# Patient Record
Sex: FEMALE | Race: WHITE | Employment: FULL TIME | URBAN - NONMETROPOLITAN AREA
[De-identification: names, ages, dates, MRNs, and addresses within clinical notes are randomized per-mention and may not be internally consistent; named-entity substitution may affect disease eponyms.]

---

## 2018-07-26 ENCOUNTER — HOSPITAL ENCOUNTER (EMERGENCY)
Age: 43
Discharge: HOME | End: 2018-07-26
Payer: COMMERCIAL

## 2018-07-26 VITALS
TEMPERATURE: 98.3 F | SYSTOLIC BLOOD PRESSURE: 122 MMHG | RESPIRATION RATE: 18 BRPM | DIASTOLIC BLOOD PRESSURE: 83 MMHG | BODY MASS INDEX: 27.41 KG/M2 | OXYGEN SATURATION: 97 % | HEIGHT: 65 IN | HEART RATE: 74 BPM | WEIGHT: 164.5 LBS

## 2018-07-26 DIAGNOSIS — Z76.89 ENCOUNTER TO ESTABLISH CARE WITH NEW DOCTOR: ICD-10-CM

## 2018-07-26 DIAGNOSIS — G43.009 MIGRAINE WITHOUT AURA AND WITHOUT STATUS MIGRAINOSUS, NOT INTRACTABLE: Primary | ICD-10-CM

## 2018-07-26 PROCEDURE — 99213 OFFICE O/P EST LOW 20 MIN: CPT | Performed by: NURSE PRACTITIONER

## 2018-07-26 PROCEDURE — 99214 OFFICE O/P EST MOD 30 MIN: CPT

## 2018-07-26 PROCEDURE — 2709999900 HC NON-CHARGEABLE SUPPLY

## 2018-07-26 PROCEDURE — 96372 THER/PROPH/DIAG INJ SC/IM: CPT

## 2018-07-26 PROCEDURE — 6360000002 HC RX W HCPCS: Performed by: NURSE PRACTITIONER

## 2018-07-26 RX ORDER — SUMATRIPTAN 50 MG/1
TABLET, FILM COATED ORAL
Qty: 9 TABLET | Refills: 0 | Status: SHIPPED | OUTPATIENT
Start: 2018-07-26 | End: 2018-10-01 | Stop reason: ALTCHOICE

## 2018-07-26 RX ORDER — KETOROLAC TROMETHAMINE 30 MG/ML
15 INJECTION, SOLUTION INTRAMUSCULAR; INTRAVENOUS ONCE
Status: COMPLETED | OUTPATIENT
Start: 2018-07-26 | End: 2018-07-26

## 2018-07-26 RX ADMIN — KETOROLAC TROMETHAMINE: 30 INJECTION, SOLUTION INTRAMUSCULAR at 12:56

## 2018-07-26 ASSESSMENT — PAIN SCALES - GENERAL
PAINLEVEL_OUTOF10: 8
PAINLEVEL_OUTOF10: 8

## 2018-07-26 ASSESSMENT — ENCOUNTER SYMPTOMS
SINUS PAIN: 0
SINUS PRESSURE: 0
SORE THROAT: 0
EYE PAIN: 0
PHOTOPHOBIA: 0

## 2018-07-26 ASSESSMENT — PAIN DESCRIPTION - LOCATION: LOCATION: HEAD

## 2018-07-26 ASSESSMENT — PAIN DESCRIPTION - DESCRIPTORS: DESCRIPTORS: ACHING

## 2018-07-26 NOTE — ED TRIAGE NOTES
Pt to room 2 with c/o headache and sob. Pt states has had headache about a week and sob is new. Pt denies any chest pain or cardiac issues. Pt sarah has tried otc remedies with no relief.

## 2018-07-26 NOTE — ED PROVIDER NOTES
Ibuprofen-Diphenhydramine Cit (ADVIL PM PO) Take 2 tablets by mouth as needed       Acetaminophen (TYLENOL EXTRA STRENGTH PO) Take 1,500 mg by mouth every 6 hours as needed              ALLERGIES     Patient is has No Known Allergies. Patients   Immunization History   Administered Date(s) Administered    Tdap (Boostrix, Adacel) 08/30/2016       FAMILY HISTORY     Patient's family history includes Diabetes in her paternal grandmother; Heart Disease in her mother; High Blood Pressure in her mother; Other in her maternal grandfather, maternal grandmother, mother, and paternal grandmother. SOCIAL HISTORY     Patient  reports that she has been smoking Cigarettes. She started smoking about 30 years ago. She has a 9.00 pack-year smoking history. She uses smokeless tobacco. She reports that she drinks alcohol. She reports that she does not use drugs. PHYSICAL EXAM     ED TRIAGE VITALS  BP: 122/83, Temp: 98.3 °F (36.8 °C), Pulse: 74, Resp: 18, SpO2: 97 %,Estimated body mass index is 27.37 kg/m² as calculated from the following:    Height as of this encounter: 5' 5\" (1.651 m). Weight as of this encounter: 164 lb 8 oz (74.6 kg). ,Patient's last menstrual period was 07/24/2018 (exact date). Physical Exam   Constitutional: She is oriented to person, place, and time. She appears well-developed and well-nourished. HENT:   Head: Normocephalic and atraumatic. Musculoskeletal: Normal range of motion. Neurological: She is alert and oriented to person, place, and time. Skin: Skin is warm and dry. Psychiatric: She has a normal mood and affect. Her behavior is normal. Judgment and thought content normal.       DIAGNOSTIC RESULTS   Labs:No results found for this visit on 07/26/18.     IMAGING:    No orders to display     URGENT CARE COURSE:     Vitals:    07/26/18 1235 07/26/18 1241   BP:  122/83   Pulse:  74   Resp:  18   Temp:  98.3 °F (36.8 °C)   TempSrc:  Temporal   SpO2:  97%   Weight: 164 lb 8 oz (74.6 kg) Height: 5' 5\" (1.651 m)        Medications   ketorolac (TORADOL) injection 15 mg ( Intramuscular Given 7/26/18 1256)            PROCEDURES:  None    FINAL IMPRESSION      1. Migraine without aura and without status migrainosus, not intractable    2. Encounter to establish care with new doctor          DISPOSITION/PLAN   DISPOSITION Decision To Discharge 07/26/2018 12:59:18 PM patient is discharged home with prescription for Imitrex, 9 tablets with instructions on how to take. Patient was also given Toradol injection while urgent care setting. Discussed with patient that she would be referred to Nawaf Ruiz CNP for further management of migraines. PATIENT REFERRED TO:  No follow-up provider specified. DISCHARGE MEDICATIONS:  Discharge Medication List as of 7/26/2018  1:07 PM      START taking these medications    Details   SUMAtriptan (IMITREX) 50 MG tablet Take as needed, 1 tablet. May take 2nd tablet within 2 hours if headaches continues. Do not take more than 200 mg in a 24 hour period. , Disp-9 tablet, R-0Print             Discharge Medication List as of 7/26/2018  1:07 PM      STOP taking these medications       Pseudoephedrine HCl (SUDAFED PO) Comments:   Reason for Stopping:         topiramate (TOPAMAX) 50 MG tablet Comments:   Reason for Stopping:         buPROPion (WELLBUTRIN XL) 150 MG XL tablet Comments:   Reason for Stopping:         traZODone (DESYREL) 50 MG tablet Comments:   Reason for Stopping:         guaiFENesin (MUCINEX) 600 MG SR tablet Comments:   Reason for Stopping:         guaiFENesin (ALTARUSSIN) 100 MG/5ML syrup Comments:   Reason for Stopping:               Discharge Medication List as of 7/26/2018  1:07 PM          ANGELA Hernández - NP    (Please note that portions of this note were completed with a voice recognition program.  Efforts were made to edit the dictations but occasionally words are mis-transcribed.)         ANGELA Dsouza - NP  07/26/18 134

## 2018-08-01 ENCOUNTER — OFFICE VISIT (OUTPATIENT)
Dept: FAMILY MEDICINE CLINIC | Age: 43
End: 2018-08-01
Payer: COMMERCIAL

## 2018-08-01 VITALS
DIASTOLIC BLOOD PRESSURE: 60 MMHG | RESPIRATION RATE: 16 BRPM | WEIGHT: 164.4 LBS | HEART RATE: 83 BPM | TEMPERATURE: 98.1 F | SYSTOLIC BLOOD PRESSURE: 96 MMHG | BODY MASS INDEX: 27.39 KG/M2 | HEIGHT: 65 IN

## 2018-08-01 DIAGNOSIS — G43.909 MIGRAINE WITHOUT STATUS MIGRAINOSUS, NOT INTRACTABLE, UNSPECIFIED MIGRAINE TYPE: Primary | ICD-10-CM

## 2018-08-01 PROCEDURE — 99213 OFFICE O/P EST LOW 20 MIN: CPT | Performed by: NURSE PRACTITIONER

## 2018-08-01 RX ORDER — TOPIRAMATE 25 MG/1
25 TABLET ORAL NIGHTLY
Qty: 30 TABLET | Refills: 3 | Status: SHIPPED | OUTPATIENT
Start: 2018-08-01 | End: 2018-10-01 | Stop reason: SDUPTHER

## 2018-08-01 RX ORDER — HYDROCODONE BITARTRATE AND ACETAMINOPHEN 5; 325 MG/1; MG/1
1 TABLET ORAL EVERY 6 HOURS PRN
Qty: 28 TABLET | Refills: 0 | Status: SHIPPED | OUTPATIENT
Start: 2018-08-01 | End: 2018-08-08

## 2018-08-01 ASSESSMENT — PATIENT HEALTH QUESTIONNAIRE - PHQ9
1. LITTLE INTEREST OR PLEASURE IN DOING THINGS: 0
SUM OF ALL RESPONSES TO PHQ QUESTIONS 1-9: 0
2. FEELING DOWN, DEPRESSED OR HOPELESS: 0
SUM OF ALL RESPONSES TO PHQ9 QUESTIONS 1 & 2: 0

## 2018-08-01 NOTE — PROGRESS NOTES
Radha Ghosh is a 39 y.o. female that presents for Establish Care (Pt presents to establish care with us. She was a previous pt of Kei Claire at [facility].) and Headache (Pt is c/o a headache that she has had for about 2 weeks. She went to  last week and was given imitrex and an injection of Kenalog, but she states she is not doing much better. The imitrex takes the edge off, but it doesn't do more than that. Pt states her daughter called her on Sunday and told her she has the same sxs. )      Headache    HPI:Pt has taken topamax in the past for migraine headaches and worked well. She did have an mri of brain in 2016 that was normal.     Location - frontal and travels to back of head  Duration2 weeks ago  Inciting event? - No  Severity  6/10  History of migraines? -  Yes  Treatment tried and response - imitrex and kenalog tylenol and ibuprofen    Fever over 100.5 No  Neck stiffness  No  Weakness of arm/leg  No  Nausea/vomiting - Yes - she has bene nauseated  \"Worst headache ever\" - No  Confusion - she does have trouble concentrating and thinking because the pain is intense at times but does dull at other times    PHYSICAL EXAM:    Vitals:    08/01/18 1505   BP: 96/60   Pulse: 83   Resp: 16   Temp: 98.1 °F (36.7 °C)     GEN:  NAD  HEENT:  NCAT, PERRL, EOMI, Nares clear, turbinates pink, mucosa is moist.  Oropharynx  is clear. Hearing grossly intact. Dentition is normal for age. Neck: No lymphadenopathy or masses. No neck stiffness noted   Heart: RRR. S1 and S2 normal, no murmurs, clicks, gallops or rubs. Lungs:  CTAB,  . Abdomen:  Soft, non tender, non distended. No rebound or guarding. No organomegaly. Extremities:  No gross deformity, erythema or edema of the lower extremities. Skin: No pathologic lesions or significant rash. Neurological:  A&Ox3. CN 2-12 grossly intact. 5/5 strength globally and symmetrically.  Cerebellar testing wnl. 2+ patellar reflexes b/l    ASSESSMENT & PLAN  Fifi

## 2018-10-01 ENCOUNTER — OFFICE VISIT (OUTPATIENT)
Dept: FAMILY MEDICINE CLINIC | Age: 43
End: 2018-10-01
Payer: COMMERCIAL

## 2018-10-01 VITALS
RESPIRATION RATE: 16 BRPM | HEIGHT: 65 IN | DIASTOLIC BLOOD PRESSURE: 80 MMHG | WEIGHT: 165.4 LBS | SYSTOLIC BLOOD PRESSURE: 118 MMHG | BODY MASS INDEX: 27.56 KG/M2 | TEMPERATURE: 98.1 F | HEART RATE: 70 BPM

## 2018-10-01 DIAGNOSIS — Z72.0 TOBACCO ABUSE: ICD-10-CM

## 2018-10-01 DIAGNOSIS — Z91.89 LACK OF MOTIVATION: ICD-10-CM

## 2018-10-01 DIAGNOSIS — R45.4 IRRITABLE: ICD-10-CM

## 2018-10-01 DIAGNOSIS — Z71.6 ENCOUNTER FOR SMOKING CESSATION COUNSELING: ICD-10-CM

## 2018-10-01 DIAGNOSIS — L29.9 ITCHING: Primary | ICD-10-CM

## 2018-10-01 DIAGNOSIS — G43.909 MIGRAINE WITHOUT STATUS MIGRAINOSUS, NOT INTRACTABLE, UNSPECIFIED MIGRAINE TYPE: ICD-10-CM

## 2018-10-01 PROCEDURE — 99214 OFFICE O/P EST MOD 30 MIN: CPT | Performed by: NURSE PRACTITIONER

## 2018-10-01 RX ORDER — TOPIRAMATE 25 MG/1
25 TABLET ORAL NIGHTLY
Qty: 90 TABLET | Refills: 1 | Status: SHIPPED | OUTPATIENT
Start: 2018-10-01 | End: 2020-01-27

## 2018-10-01 RX ORDER — LORATADINE 10 MG/1
10 TABLET ORAL DAILY
Qty: 30 TABLET | Refills: 3 | Status: SHIPPED | OUTPATIENT
Start: 2018-10-01

## 2018-10-01 RX ORDER — BUPROPION HYDROCHLORIDE 150 MG/1
150 TABLET ORAL EVERY MORNING
Qty: 30 TABLET | Refills: 3 | Status: SHIPPED | OUTPATIENT
Start: 2018-10-01 | End: 2019-01-17 | Stop reason: SDUPTHER

## 2018-10-01 ASSESSMENT — ENCOUNTER SYMPTOMS
RESPIRATORY NEGATIVE: 1
COLOR CHANGE: 0

## 2018-12-10 ENCOUNTER — OFFICE VISIT (OUTPATIENT)
Dept: FAMILY MEDICINE CLINIC | Age: 43
End: 2018-12-10
Payer: COMMERCIAL

## 2018-12-10 VITALS
BODY MASS INDEX: 26.06 KG/M2 | DIASTOLIC BLOOD PRESSURE: 70 MMHG | TEMPERATURE: 98.3 F | WEIGHT: 166 LBS | SYSTOLIC BLOOD PRESSURE: 104 MMHG | HEART RATE: 72 BPM | HEIGHT: 67 IN | RESPIRATION RATE: 16 BRPM

## 2018-12-10 DIAGNOSIS — R07.89 OTHER CHEST PAIN: ICD-10-CM

## 2018-12-10 DIAGNOSIS — F41.9 ANXIETY: ICD-10-CM

## 2018-12-10 DIAGNOSIS — K58.2 IRRITABLE BOWEL SYNDROME WITH BOTH CONSTIPATION AND DIARRHEA: ICD-10-CM

## 2018-12-10 DIAGNOSIS — R07.89 CHEST TIGHTNESS: Primary | ICD-10-CM

## 2018-12-10 DIAGNOSIS — R14.3 FLATULENCE: ICD-10-CM

## 2018-12-10 DIAGNOSIS — Z71.6 ENCOUNTER FOR TOBACCO USE CESSATION COUNSELING: ICD-10-CM

## 2018-12-10 PROCEDURE — 99214 OFFICE O/P EST MOD 30 MIN: CPT | Performed by: NURSE PRACTITIONER

## 2018-12-10 PROCEDURE — 93000 ELECTROCARDIOGRAM COMPLETE: CPT | Performed by: NURSE PRACTITIONER

## 2018-12-10 RX ORDER — DICYCLOMINE HYDROCHLORIDE 10 MG/1
10 CAPSULE ORAL 4 TIMES DAILY
Qty: 120 CAPSULE | Refills: 3 | Status: SHIPPED | OUTPATIENT
Start: 2018-12-10 | End: 2022-04-27 | Stop reason: ALTCHOICE

## 2018-12-10 ASSESSMENT — ENCOUNTER SYMPTOMS
RHINORRHEA: 0
GASTROINTESTINAL NEGATIVE: 1
CHEST TIGHTNESS: 1
SINUS PAIN: 0

## 2018-12-10 NOTE — PROGRESS NOTES
[address]  [address] [address]  Dept: [phone]  Dept Fax: [phone]  Loc: [phone]    Ghanshyam Kirkpatrick is a 39 y.o. Maurice Lowe presents today for her medical conditions/complaints as noted below. Fifi Brizuela c/o of Follow-up (smoking ); Other (chess tightness ); and Gas (lots after eating  abd cramping  with  B.M.s)      HPI:     Pt here to discuss several things. States she has been taking the wellbutrin for smoking cessation and she has decreased her smoking from 1-1 1/2 packs per day to 5 cigarettes per day. She states she is no longer thinking all day about smoking and can get more work done. States her craving has decreased and she also noticed has helped to improve her concentration. HEr mother had a CABG at age of 27 and passed away at age 64 due to heart related illness. States she has frequent bowel cramping and feels gassy after eating. She states the gas does usually come before a bowel movement. They will be soft and some days she does not have a bowel movement at all. Denies blood in her stool, denies any autoimmune illness in her family. Chest Pain    Location -  left chest  Symptoms have been present for 2 month(s). Onset of symptoms was sudden. Inciting Event? No       Description of chest pain -heaviness, squeezing . The pain does not radiate. Intensity - moderate. Aggravating factors - nothing. Alleviating factors -  Nothing, it mostly happens at rest and goes away on it's own. N/V? No  SOB? No  Lightheadedness? No  Palpitations? No  Diaphoresis? No  Cough? No    Cardiac risk factors , smoking/tobacco exposure, family history, and sedentary lifestyle)?             Current Outpatient Prescriptions   Medication Sig Dispense Refill    dicyclomine (BENTYL) 10 MG capsule Take 1 capsule by mouth 4 times daily 120 capsule 3    topiramate (TOPAMAX) 25 MG tablet Take 1 tablet by Yes[]  No  []       Patient given educational materials -see patient instructions. Discussed use, benefit, and side effects of prescribedmedications. All patient questions answered. Pt voiced understanding. Reviewedhealth maintenance. Instructed to continue current medications, diet and exercise. Patient agreed with treatment plan. Follow up as directed.        Electronicallysigned by ANGELA Cedillo CNP on 12/10/2018 at 9:46 AM

## 2018-12-10 NOTE — PATIENT INSTRUCTIONS
You may receive a survey about your visit with us today. The feedback from our patients helps us identify what is working well and where the service to all patients can be enhanced. Thank you! Patient Education        Diet for Irritable Bowel Syndrome: Care Instructions  Your Care Instructions    Irritable bowel syndrome, or IBS, is a problem with the intestines. IBS can cause belly pain, bloating, gas, constipation, and diarrhea. Most people can control their symptoms by changing their diet and easing stress. No specific foods cause everyone with IBS to have symptoms. Doctors don't offer a specific diet to manage symptoms. But many people find that they feel better when they stop eating certain foods. A high-fiber diet may help if you have constipation. Follow-up care is a key part of your treatment and safety. Be sure to make and go to all appointments, and call your doctor if you are having problems. It's also a good idea to know your test results and keep a list of the medicines you take. How can you care for yourself at home? To reduce constipation  · Include fruits, vegetables, beans, and whole grains in your diet each day. These foods are high in fiber. Slowly increase the amount of fiber you eat. This helps you avoid a lot of gas. · Drink plenty of fluids, enough so that your urine is light yellow or clear like water. If you have kidney, heart, or liver disease and have to limit fluids, talk with your doctor before you increase the amount of fluids you drink. · Get some exercise every day. Build up slowly to 30 to 60 minutes a day on 5 or more days of the week. · Take a fiber supplement, such as Citrucel or Metamucil, every day if needed. Read and follow all instructions on the label. · Schedule time each day for a bowel movement. Having a daily routine may help. Take your time and do not strain when having a bowel movement.   · Check with your doctor before you increase the amount of fiber in your

## 2018-12-18 ENCOUNTER — TELEPHONE (OUTPATIENT)
Dept: FAMILY MEDICINE CLINIC | Age: 43
End: 2018-12-18

## 2018-12-18 DIAGNOSIS — R07.9 CHEST PAIN, UNSPECIFIED TYPE: Primary | ICD-10-CM

## 2019-01-11 ENCOUNTER — HOSPITAL ENCOUNTER (OUTPATIENT)
Age: 44
Discharge: HOME | End: 2019-01-11
Payer: COMMERCIAL

## 2019-01-11 ENCOUNTER — HOSPITAL ENCOUNTER (OUTPATIENT)
Dept: NON INVASIVE DIAGNOSTICS | Age: 44
Discharge: HOME | End: 2019-01-11
Payer: COMMERCIAL

## 2019-01-11 VITALS — BODY MASS INDEX: 27.99 KG/M2 | WEIGHT: 168 LBS | HEIGHT: 65 IN

## 2019-01-11 DIAGNOSIS — R07.9 CHEST PAIN, UNSPECIFIED TYPE: ICD-10-CM

## 2019-01-11 DIAGNOSIS — R07.89 OTHER CHEST PAIN: ICD-10-CM

## 2019-01-11 DIAGNOSIS — R07.89 CHEST TIGHTNESS: ICD-10-CM

## 2019-01-11 LAB
ANION GAP SERPL CALCULATED.3IONS-SCNC: 12 MEQ/L (ref 8–16)
BUN BLDV-MCNC: 12 MG/DL (ref 7–22)
CALCIUM SERPL-MCNC: 9.3 MG/DL (ref 8.5–10.5)
CHLORIDE BLD-SCNC: 105 MEQ/L (ref 98–111)
CHOLESTEROL, TOTAL: 205 MG/DL (ref 100–199)
CO2: 24 MEQ/L (ref 23–33)
CREAT SERPL-MCNC: 0.9 MG/DL (ref 0.4–1.2)
GFR SERPL CREATININE-BSD FRML MDRD: 68 ML/MIN/1.73M2
GLUCOSE BLD-MCNC: 83 MG/DL (ref 70–108)
HDLC SERPL-MCNC: 54 MG/DL
LDL CHOLESTEROL CALCULATED: 125 MG/DL
POTASSIUM SERPL-SCNC: 3.8 MEQ/L (ref 3.5–5.2)
SODIUM BLD-SCNC: 141 MEQ/L (ref 135–145)
TRIGL SERPL-MCNC: 131 MG/DL (ref 0–199)

## 2019-01-11 PROCEDURE — 36415 COLL VENOUS BLD VENIPUNCTURE: CPT

## 2019-01-11 PROCEDURE — 93017 CV STRESS TEST TRACING ONLY: CPT | Performed by: INTERNAL MEDICINE

## 2019-01-11 PROCEDURE — 80061 LIPID PANEL: CPT

## 2019-01-11 PROCEDURE — 80048 BASIC METABOLIC PNL TOTAL CA: CPT

## 2019-01-12 ENCOUNTER — TELEPHONE (OUTPATIENT)
Dept: FAMILY MEDICINE CLINIC | Age: 44
End: 2019-01-12

## 2019-01-14 ENCOUNTER — TELEPHONE (OUTPATIENT)
Dept: FAMILY MEDICINE CLINIC | Age: 44
End: 2019-01-14

## 2019-01-17 ENCOUNTER — OFFICE VISIT (OUTPATIENT)
Dept: FAMILY MEDICINE CLINIC | Age: 44
End: 2019-01-17
Payer: COMMERCIAL

## 2019-01-17 ENCOUNTER — TELEPHONE (OUTPATIENT)
Dept: FAMILY MEDICINE CLINIC | Age: 44
End: 2019-01-17

## 2019-01-17 VITALS
DIASTOLIC BLOOD PRESSURE: 70 MMHG | WEIGHT: 168.6 LBS | TEMPERATURE: 98.7 F | SYSTOLIC BLOOD PRESSURE: 112 MMHG | BODY MASS INDEX: 26.46 KG/M2 | HEIGHT: 67 IN | RESPIRATION RATE: 18 BRPM | HEART RATE: 62 BPM

## 2019-01-17 DIAGNOSIS — K58.2 IRRITABLE BOWEL SYNDROME WITH BOTH CONSTIPATION AND DIARRHEA: ICD-10-CM

## 2019-01-17 DIAGNOSIS — G44.229 CHRONIC TENSION-TYPE HEADACHE, NOT INTRACTABLE: Primary | ICD-10-CM

## 2019-01-17 DIAGNOSIS — Z71.6 ENCOUNTER FOR SMOKING CESSATION COUNSELING: ICD-10-CM

## 2019-01-17 DIAGNOSIS — R45.86 EMOTIONAL LABILITY: ICD-10-CM

## 2019-01-17 DIAGNOSIS — Z72.0 TOBACCO ABUSE: ICD-10-CM

## 2019-01-17 PROCEDURE — 99213 OFFICE O/P EST LOW 20 MIN: CPT | Performed by: NURSE PRACTITIONER

## 2019-01-17 RX ORDER — VENLAFAXINE HYDROCHLORIDE 37.5 MG/1
37.5 CAPSULE, EXTENDED RELEASE ORAL DAILY
Qty: 30 CAPSULE | Refills: 3 | Status: SHIPPED | OUTPATIENT
Start: 2019-01-17 | End: 2019-03-11 | Stop reason: SINTOL

## 2019-01-17 RX ORDER — BUPROPION HYDROCHLORIDE 150 MG/1
150 TABLET ORAL EVERY MORNING
Qty: 30 TABLET | Refills: 6 | Status: SHIPPED | OUTPATIENT
Start: 2019-01-17 | End: 2019-03-11 | Stop reason: ALTCHOICE

## 2019-01-17 ASSESSMENT — ENCOUNTER SYMPTOMS
RESPIRATORY NEGATIVE: 1
DIARRHEA: 0
NAUSEA: 0
ABDOMINAL PAIN: 0
CONSTIPATION: 0
BLOOD IN STOOL: 0
ABDOMINAL DISTENTION: 0

## 2019-03-11 ENCOUNTER — OFFICE VISIT (OUTPATIENT)
Dept: FAMILY MEDICINE CLINIC | Age: 44
End: 2019-03-11
Payer: COMMERCIAL

## 2019-03-11 ENCOUNTER — HOSPITAL ENCOUNTER (OUTPATIENT)
Age: 44
Discharge: HOME | End: 2019-03-11
Payer: COMMERCIAL

## 2019-03-11 ENCOUNTER — HOSPITAL ENCOUNTER (OUTPATIENT)
Dept: GENERAL RADIOLOGY | Age: 44
Discharge: HOME | End: 2019-03-11
Payer: COMMERCIAL

## 2019-03-11 VITALS
SYSTOLIC BLOOD PRESSURE: 116 MMHG | HEIGHT: 66 IN | TEMPERATURE: 98.1 F | DIASTOLIC BLOOD PRESSURE: 74 MMHG | BODY MASS INDEX: 28.45 KG/M2 | HEART RATE: 76 BPM | RESPIRATION RATE: 14 BRPM | WEIGHT: 177 LBS

## 2019-03-11 DIAGNOSIS — R05.3 PERSISTENT DRY COUGH: ICD-10-CM

## 2019-03-11 DIAGNOSIS — R05.3 PERSISTENT DRY COUGH: Primary | ICD-10-CM

## 2019-03-11 PROCEDURE — 71046 X-RAY EXAM CHEST 2 VIEWS: CPT

## 2019-03-11 PROCEDURE — 99213 OFFICE O/P EST LOW 20 MIN: CPT | Performed by: NURSE PRACTITIONER

## 2019-03-11 RX ORDER — BENZONATATE 100 MG/1
100-200 CAPSULE ORAL 3 TIMES DAILY PRN
Qty: 60 CAPSULE | Refills: 1 | Status: SHIPPED | OUTPATIENT
Start: 2019-03-11 | End: 2019-03-18

## 2019-03-11 ASSESSMENT — ENCOUNTER SYMPTOMS
CHOKING: 0
COUGH: 1
CHEST TIGHTNESS: 0
SHORTNESS OF BREATH: 0
WHEEZING: 0
SINUS PAIN: 0
SINUS PRESSURE: 0

## 2019-03-11 ASSESSMENT — PATIENT HEALTH QUESTIONNAIRE - PHQ9
SUM OF ALL RESPONSES TO PHQ QUESTIONS 1-9: 1
SUM OF ALL RESPONSES TO PHQ QUESTIONS 1-9: 1
2. FEELING DOWN, DEPRESSED OR HOPELESS: 0
SUM OF ALL RESPONSES TO PHQ9 QUESTIONS 1 & 2: 1
1. LITTLE INTEREST OR PLEASURE IN DOING THINGS: 1

## 2019-03-12 ENCOUNTER — TELEPHONE (OUTPATIENT)
Dept: FAMILY MEDICINE CLINIC | Age: 44
End: 2019-03-12

## 2019-08-29 ENCOUNTER — OFFICE VISIT (OUTPATIENT)
Dept: FAMILY MEDICINE CLINIC | Age: 44
End: 2019-08-29
Payer: COMMERCIAL

## 2019-08-29 VITALS
DIASTOLIC BLOOD PRESSURE: 62 MMHG | SYSTOLIC BLOOD PRESSURE: 124 MMHG | TEMPERATURE: 98.1 F | HEART RATE: 80 BPM | WEIGHT: 176.2 LBS | BODY MASS INDEX: 28.02 KG/M2 | RESPIRATION RATE: 10 BRPM

## 2019-08-29 DIAGNOSIS — F34.1 DYSTHYMIA: Primary | ICD-10-CM

## 2019-08-29 DIAGNOSIS — R41.840 DISTURBED CONCENTRATION: ICD-10-CM

## 2019-08-29 DIAGNOSIS — Z91.89 LACK OF MOTIVATION: ICD-10-CM

## 2019-08-29 PROCEDURE — 99213 OFFICE O/P EST LOW 20 MIN: CPT | Performed by: NURSE PRACTITIONER

## 2019-08-29 RX ORDER — ATOMOXETINE 18 MG/1
18 CAPSULE ORAL DAILY
Qty: 30 CAPSULE | Refills: 3 | Status: SHIPPED | OUTPATIENT
Start: 2019-08-29 | End: 2019-12-18

## 2019-08-29 ASSESSMENT — ENCOUNTER SYMPTOMS: RESPIRATORY NEGATIVE: 1

## 2019-08-29 NOTE — PATIENT INSTRUCTIONS
Tobacco Cessation Programs     Telephonic behavior modification  Cherylene Barnacle ([phone])   Counseling service for those who are ready to quit using tobacco.     Available for uninsured [place] residents, [place] recipients, pregnant women, or patients whose health plans or employers are members of the [address] [place] [address] behavior modification   http://[place]. [employer]uitlogix. org   Online support program to help patients through each step of the quitting process. Available 24 hours a day 7 days a week. Provides up to date researched based tool, step-by-step guides, and motivational messages. Online behavior modification   www.lungusa.org/stop-smoking/how-to-quit   HelpLine: [facility] (895-5405)   Email questions to:  Rexford@[employer]. TOOVIA    Website offers resources to help tobacco users figure out their reasons for quitting and then take the big step of quitting for good. Hypnosis   Location: [address] [facility]   Contact: Lennox Hyde, PhD at [phone]   Hypnosis for tobacco cessation   Cost $225 for the initial session and $175 for each session afterwards. Most patients require 6-8 sessions. There is the option to submit through the patients insurance. Hypnosis and behavior modification   Location: [facility] El[address],  [facility] [address] [facility] [address] FONG [employer], [place]   Contact: Carmen Rodriguez, PhD at [phone]  [facility] Counseling and hypnosis for nicotine addition   Cost: For uninsured patients:  Please call above phone number  Cost for insured patients depends on patients insurance plan. Behavior modification   Location: [facility], [address] [facility] [address], [place]   Contact: Logan Moore include four one-on-one appointments between the patient and a respiratory therapist.  The four appointments span over three weeks. The respiratory therapist schedules one of the appointments to occur 48 hours after the patients quit date.     Cost $100 total for the four sessions. Tobacco cessation products are not included in the cost and are not provided by [address] may receive a survey about your visit with us today. The feedback from our patients helps us identify what is working well and where the service to all patients can be enhanced. Thank you! Patient Education        atomoxetine  Pronunciation:  AT oh mox e teen  Brand:  Strattera  What is the most important information I should know about atomoxetine? You should not use atomoxetine if you have narrow-angle glaucoma, an adrenal gland tumor, heart disease or coronary artery disease, or moderate to severe high blood pressure. Do not use atomoxetine if you have taken an MAO inhibitor in the past 14 days, including isocarboxazid, linezolid, methylene blue injection, phenelzine, rasagiline, selegiline, tranylcypromine, and others. Atomoxetine may cause new or worsening psychosis (unusual thoughts or behavior), especially if you have a history of depression, mental illness, or bipolar disorder. Atomoxetine has caused stroke, heart attack, and sudden death in people with high blood pressure, heart disease, or a heart defect. Some young people have thoughts about suicide when first taking atomoxetine, or whenever the dose is changed. Stay alert to changes in your mood or symptoms, especially if you have ever had suicidal thoughts. What is atomoxetine? Atomoxetine affects chemicals in the brain and nerves that contribute to hyperactivity and impulse control. Atomoxetine is used to treat attention deficit hyperactivity disorder (ADHD). Atomoxetine may also be used for purposes not listed in this medication guide. What should I discuss with my healthcare provider before taking atomoxetine? Do not use atomoxetine if you have used an MAO inhibitor in the past 14 days. A dangerous drug interaction could occur.  MAO inhibitors include isocarboxazid, linezolid, methylene blue injection, phenelzine, rasagiline, selegiline, tranylcypromine, and others. You should not use atomoxetine if you are allergic to it, or if you have:  · severe heart or blood vessel problems;  · narrow-angle glaucoma; or  · pheochromocytoma (tumor of the adrenal gland). Atomoxetine has caused stroke, heart attack, and sudden death in certain people. Tell your doctor if you have:  · heart problems or a congenital heart defect;  · high blood pressure; or  · a family history of heart disease or sudden death. To make sure this medicine is safe for you, tell your doctor if you or anyone in your family has ever had:  · depression, mental illness, bipolar disorder, psychosis;  · suicidal thoughts or actions;  · low blood pressure; or  · liver disease. Some young people have thoughts about suicide when first taking atomoxetine, or whenever the dose is changed. Your doctor should check your progress at regular visits. Your family or other caregivers should also be alert to changes in your mood or symptoms. It is not known whether this medicine will harm an unborn baby. Tell your doctor if you are pregnant or plan to become pregnant. It is not known whether atomoxetine passes into breast milk or if it could affect a nursing baby. Tell your doctor if you are breast-feeding a baby. Atomoxetine is not approved for use by anyone younger than 10years old. How should I take atomoxetine? Follow all directions on your prescription label. Your doctor may occasionally change your dose. Do not use this medicine in larger or smaller amounts or for longer than recommended. Take the medicine at the same time each day, with a full glass of water. Atomoxetine is usually taken once daily in the morning, or two times per day in the morning and late afternoon. Follow your doctor's instructions. You may take atomoxetine with or without food. Do not crush, chew, break, or open an atomoxetine capsule. Swallow it whole.  Tell your doctor if you have trouble swallowing the capsules. Use atomoxetine regularly to get the most benefit. Get your prescription refilled before you run out of medicine completely. Do not use a capsule that has been opened or accidentally broken. The medicine from inside the capsule can be dangerous if it gets in your eyes. If this occurs, rinse your eyes with water. Ask your doctor or pharmacist how to safely handle and dispose of a broken capsule. While taking atomoxetine, your doctor will need to check your progress at regular visits. Your heart rate, blood pressure, height and weight may also need to be checked often. Store at room temperature away from moisture and heat. What happens if I miss a dose? Take the missed dose as soon as you remember. Skip the missed dose if it is almost time for your next scheduled dose. Do not take extra medicine to make up the missed dose. What happens if I overdose? Seek emergency medical attention or call the Poison Help line at 1-443.306.2266. Overdose symptoms may include drowsiness, dizziness, stomach problems, tremors, or unusual behavior. What should I avoid while taking atomoxetine? Avoid using or handling an open or broken capsule. If the medicine from inside the capsule gets in your eyes, rinse them thoroughly with water and call your doctor. Atomoxetine may impair your thinking or reactions. Be careful if you drive or do anything that requires you to be alert. What are the possible side effects of atomoxetine? Get emergency medical help if you have signs of an allergic reaction: hives; difficult breathing; swelling of your face, lips, tongue, or throat. Report any new or worsening symptoms to your doctor, such as: anxiety, panic attacks, trouble sleeping, or if you feel impulsive, irritable, agitated, hostile, aggressive, restless, hyperactive (mentally or physically), depressed, or have thoughts about suicide or hurting yourself.   Atomoxetine can affect growth in children. Tell your doctor if your child is not growing at a normal rate while using this medicine. Stop using this medicine and call your doctor at once if you have:  · signs of heart problems --chest pain, trouble breathing, feeling like you might pass out;  · signs of psychosis --hallucinations (seeing or hearing things that are not real), new behavior problems, aggression, hostility, paranoia;  · liver problems --stomach pain (upper right side), itching, flu-like symptoms, dark urine, jaundice (yellowing of the skin or eyes);  · painful or difficult urination; or  · erection is painful or lasts longer than 4 hours (this is a rare side effect). Common side effects may include:  · nausea, vomiting, upset stomach, constipation;  · dry mouth, loss of appetite;  · mood changes, feeling tired;  · dizziness;  · urination problems; or  · impotence, trouble having an erection. This is not a complete list of side effects and others may occur. Call your doctor for medical advice about side effects. You may report side effects to FDA at 8-688-FDA-7235. What other drugs will affect atomoxetine? Tell your doctor about all your current medicines and any you start or stop using, especially:  · an antidepressant;  · asthma medication;  · blood pressure medicine; or  · a cold or allergy medicine that contains a decongestant such as pseudoephedrine or phenylephrine. This list is not complete. Other drugs may interact with atomoxetine, including prescription and over-the-counter medicines, vitamins, and herbal products. Not all possible interactions are listed in this medication guide. Where can I get more information? Your pharmacist can provide more information about atomoxetine. Remember, keep this and all other medicines out of the reach of children, never share your medicines with others, and use this medication only for the indication prescribed.   Every effort has been made to ensure that the information provided by

## 2019-09-26 ENCOUNTER — OFFICE VISIT (OUTPATIENT)
Dept: FAMILY MEDICINE CLINIC | Age: 44
End: 2019-09-26
Payer: COMMERCIAL

## 2019-09-26 VITALS
WEIGHT: 175 LBS | HEART RATE: 84 BPM | DIASTOLIC BLOOD PRESSURE: 76 MMHG | RESPIRATION RATE: 10 BRPM | SYSTOLIC BLOOD PRESSURE: 106 MMHG | BODY MASS INDEX: 28.12 KG/M2 | TEMPERATURE: 98.5 F | HEIGHT: 66 IN

## 2019-09-26 DIAGNOSIS — F41.9 ANXIETY: Primary | ICD-10-CM

## 2019-09-26 DIAGNOSIS — R41.840 CONCENTRATION DEFICIT: ICD-10-CM

## 2019-09-26 DIAGNOSIS — F34.1 DYSTHYMIA: ICD-10-CM

## 2019-09-26 DIAGNOSIS — Z23 NEEDS FLU SHOT: ICD-10-CM

## 2019-09-26 PROCEDURE — 90686 IIV4 VACC NO PRSV 0.5 ML IM: CPT | Performed by: NURSE PRACTITIONER

## 2019-09-26 PROCEDURE — 90471 IMMUNIZATION ADMIN: CPT | Performed by: NURSE PRACTITIONER

## 2019-09-26 PROCEDURE — 99213 OFFICE O/P EST LOW 20 MIN: CPT | Performed by: NURSE PRACTITIONER

## 2019-09-26 RX ORDER — ATOMOXETINE 18 MG/1
18 CAPSULE ORAL DAILY
Qty: 30 CAPSULE | Refills: 3 | Status: CANCELLED | OUTPATIENT
Start: 2019-09-26

## 2019-12-18 ENCOUNTER — TELEPHONE (OUTPATIENT)
Dept: FAMILY MEDICINE CLINIC | Age: 44
End: 2019-12-18

## 2019-12-18 ENCOUNTER — NURSE ONLY (OUTPATIENT)
Dept: LAB | Age: 44
End: 2019-12-18

## 2019-12-18 ENCOUNTER — OFFICE VISIT (OUTPATIENT)
Dept: FAMILY MEDICINE CLINIC | Age: 44
End: 2019-12-18
Payer: COMMERCIAL

## 2019-12-18 VITALS
WEIGHT: 176.6 LBS | BODY MASS INDEX: 27.72 KG/M2 | TEMPERATURE: 97.8 F | RESPIRATION RATE: 10 BRPM | SYSTOLIC BLOOD PRESSURE: 101 MMHG | HEIGHT: 67 IN | HEART RATE: 81 BPM | DIASTOLIC BLOOD PRESSURE: 61 MMHG

## 2019-12-18 DIAGNOSIS — R10.11 RUQ PAIN: ICD-10-CM

## 2019-12-18 DIAGNOSIS — R53.83 FATIGUE, UNSPECIFIED TYPE: ICD-10-CM

## 2019-12-18 DIAGNOSIS — F41.9 ANXIETY: ICD-10-CM

## 2019-12-18 DIAGNOSIS — R11.0 NAUSEA: ICD-10-CM

## 2019-12-18 DIAGNOSIS — F34.1 DYSTHYMIA: ICD-10-CM

## 2019-12-18 DIAGNOSIS — E03.9 HYPOTHYROIDISM, UNSPECIFIED TYPE: Primary | ICD-10-CM

## 2019-12-18 DIAGNOSIS — R41.840 CONCENTRATION DEFICIT: Primary | ICD-10-CM

## 2019-12-18 LAB
AMYLASE: 59 U/L (ref 20–104)
ANION GAP SERPL CALCULATED.3IONS-SCNC: 13 MEQ/L (ref 8–16)
BASOPHILS # BLD: 0.9 %
BASOPHILS ABSOLUTE: 0.1 THOU/MM3 (ref 0–0.1)
BUN BLDV-MCNC: 10 MG/DL (ref 7–22)
CALCIUM SERPL-MCNC: 9.7 MG/DL (ref 8.5–10.5)
CHLORIDE BLD-SCNC: 100 MEQ/L (ref 98–111)
CO2: 22 MEQ/L (ref 23–33)
CREAT SERPL-MCNC: 0.9 MG/DL (ref 0.4–1.2)
EOSINOPHIL # BLD: 2.2 %
EOSINOPHILS ABSOLUTE: 0.2 THOU/MM3 (ref 0–0.4)
ERYTHROCYTE [DISTWIDTH] IN BLOOD BY AUTOMATED COUNT: 12.2 % (ref 11.5–14.5)
ERYTHROCYTE [DISTWIDTH] IN BLOOD BY AUTOMATED COUNT: 41.6 FL (ref 35–45)
FOLATE: 6.8 NG/ML (ref 4.8–24.2)
GFR SERPL CREATININE-BSD FRML MDRD: 68 ML/MIN/1.73M2
GLUCOSE BLD-MCNC: 87 MG/DL (ref 70–108)
HCT VFR BLD CALC: 48.1 % (ref 37–47)
HEMOGLOBIN: 16 GM/DL (ref 12–16)
IMMATURE GRANS (ABS): 0.05 THOU/MM3 (ref 0–0.07)
IMMATURE GRANULOCYTES: 0.6 %
LIPASE: 24.9 U/L (ref 5.6–51.3)
LYMPHOCYTES # BLD: 24 %
LYMPHOCYTES ABSOLUTE: 1.9 THOU/MM3 (ref 1–4.8)
MCH RBC QN AUTO: 31 PG (ref 26–33)
MCHC RBC AUTO-ENTMCNC: 33.3 GM/DL (ref 32.2–35.5)
MCV RBC AUTO: 93.2 FL (ref 81–99)
MONOCYTES # BLD: 6.7 %
MONOCYTES ABSOLUTE: 0.5 THOU/MM3 (ref 0.4–1.3)
NUCLEATED RED BLOOD CELLS: 0 /100 WBC
PLATELET # BLD: 333 THOU/MM3 (ref 130–400)
PMV BLD AUTO: 10.5 FL (ref 9.4–12.4)
POTASSIUM SERPL-SCNC: 4.1 MEQ/L (ref 3.5–5.2)
RBC # BLD: 5.16 MILL/MM3 (ref 4.2–5.4)
SEG NEUTROPHILS: 65.6 %
SEGMENTED NEUTROPHILS ABSOLUTE COUNT: 5.2 THOU/MM3 (ref 1.8–7.7)
SODIUM BLD-SCNC: 135 MEQ/L (ref 135–145)
T4 FREE: 0.77 NG/DL (ref 0.93–1.76)
TSH SERPL DL<=0.05 MIU/L-ACNC: 19.49 UIU/ML (ref 0.4–4.2)
VITAMIN B-12: 224 PG/ML (ref 211–911)
WBC # BLD: 7.9 THOU/MM3 (ref 4.8–10.8)

## 2019-12-18 PROCEDURE — 99214 OFFICE O/P EST MOD 30 MIN: CPT | Performed by: NURSE PRACTITIONER

## 2019-12-18 RX ORDER — SERTRALINE HYDROCHLORIDE 100 MG/1
100 TABLET, FILM COATED ORAL DAILY
Qty: 90 TABLET | Refills: 1 | Status: SHIPPED | OUTPATIENT
Start: 2019-12-18 | End: 2020-07-09

## 2019-12-18 RX ORDER — LEVOTHYROXINE SODIUM 0.05 MG/1
50 TABLET ORAL DAILY
Qty: 90 TABLET | Refills: 1 | Status: SHIPPED | OUTPATIENT
Start: 2019-12-18 | End: 2020-01-10 | Stop reason: SDUPTHER

## 2019-12-18 ASSESSMENT — ENCOUNTER SYMPTOMS
RESPIRATORY NEGATIVE: 1
CONSTIPATION: 0
ABDOMINAL DISTENTION: 0
VOMITING: 0
ABDOMINAL PAIN: 1
NAUSEA: 1
DIARRHEA: 0

## 2019-12-20 ENCOUNTER — HOSPITAL ENCOUNTER (OUTPATIENT)
Dept: ULTRASOUND IMAGING | Age: 44
Discharge: HOME | End: 2019-12-20
Payer: COMMERCIAL

## 2019-12-20 ENCOUNTER — TELEPHONE (OUTPATIENT)
Dept: FAMILY MEDICINE CLINIC | Age: 44
End: 2019-12-20

## 2019-12-20 DIAGNOSIS — R10.11 RUQ PAIN: ICD-10-CM

## 2019-12-20 DIAGNOSIS — R11.0 NAUSEA: ICD-10-CM

## 2019-12-20 PROCEDURE — 76705 ECHO EXAM OF ABDOMEN: CPT

## 2020-01-08 ENCOUNTER — TELEPHONE (OUTPATIENT)
Dept: FAMILY MEDICINE CLINIC | Age: 45
End: 2020-01-08

## 2020-01-09 ENCOUNTER — NURSE ONLY (OUTPATIENT)
Dept: LAB | Age: 45
End: 2020-01-09

## 2020-01-09 LAB
FERRITIN: 26 NG/ML (ref 10–291)
IRON: 121 UG/DL (ref 50–170)
T4 FREE: 1.04 NG/DL (ref 0.93–1.76)
TOTAL IRON BINDING CAPACITY: 411 UG/DL (ref 171–450)
TSH SERPL DL<=0.05 MIU/L-ACNC: 21.49 UIU/ML (ref 0.4–4.2)
VITAMIN D 25-HYDROXY: 17 NG/ML (ref 30–100)

## 2020-01-10 ENCOUNTER — TELEPHONE (OUTPATIENT)
Dept: FAMILY MEDICINE CLINIC | Age: 45
End: 2020-01-10

## 2020-01-10 RX ORDER — ERGOCALCIFEROL 1.25 MG/1
50000 CAPSULE ORAL WEEKLY
Qty: 12 CAPSULE | Refills: 1 | Status: SHIPPED | OUTPATIENT
Start: 2020-01-10 | End: 2020-07-09

## 2020-01-10 RX ORDER — LEVOTHYROXINE SODIUM 88 UG/1
88 TABLET ORAL DAILY
Qty: 90 TABLET | Refills: 1 | Status: SHIPPED | OUTPATIENT
Start: 2020-01-10 | End: 2020-02-24 | Stop reason: SDUPTHER

## 2020-01-10 NOTE — TELEPHONE ENCOUNTER
----- Message from [facility], NAGELA - CNP sent at 1/10/2020  9:46 AM EST -----  Let pt know her thyroid numbers have actually gone higher instead of lower, her previous tsh was  19 ,yesterday it was 21 normal range is 0.400-4. 2. Ask pt if she has been taking the thyroid medication 1 hour apart form other meds, if not she needs to. In the meantime I will increase her thyroid dose. I also want to order an u/s of her thyroid. Also her vitamin d level is 17 normal range is . I am going to start her on a high dose vitamin d tablet for 8 weeks. wE will repeat both labs tsh and vitamin d in 6-8 weeks. Let me know if she has any questions for me. Have her keep me updated through my chart how she is feeling.

## 2020-01-13 ENCOUNTER — HOSPITAL ENCOUNTER (OUTPATIENT)
Dept: ULTRASOUND IMAGING | Age: 45
Discharge: HOME | End: 2020-01-13
Payer: COMMERCIAL

## 2020-01-13 PROCEDURE — 76536 US EXAM OF HEAD AND NECK: CPT

## 2020-01-14 ENCOUNTER — TELEPHONE (OUTPATIENT)
Dept: FAMILY MEDICINE CLINIC | Age: 45
End: 2020-01-14

## 2020-01-14 NOTE — TELEPHONE ENCOUNTER
----- Message from ANGELA Skaggs CNP sent at 1/14/2020  2:28 PM EST -----  I did leave a voicemail for the pt as she did not answer her phone. Let her  Know her u/s of thyroid identified that her thyroid gland is larger than normal, it appears as though the radiologist diagnosed with  A thyroiditis which is inflammation of the thyroid gland, which is in the neck usually this is accompanied by pain though,(treatment of this is advil or aleeve and monitoring her thyroid levels until they return to normal)  ask her if she has any frontal neck pain? Also her thyroid levels had elevated instead of going down with the medication but we did increase her dose. I do want her to repeat those labs in 3 weeks just so we cn get an idea if it is going in the right direction. Ask about her fatigue and I am also going to refer her to an endocrionlogst in the meantime.

## 2020-01-27 ENCOUNTER — OFFICE VISIT (OUTPATIENT)
Dept: INTERNAL MEDICINE CLINIC | Age: 45
End: 2020-01-27
Payer: COMMERCIAL

## 2020-01-27 VITALS
SYSTOLIC BLOOD PRESSURE: 128 MMHG | BODY MASS INDEX: 29.32 KG/M2 | DIASTOLIC BLOOD PRESSURE: 69 MMHG | HEIGHT: 65 IN | WEIGHT: 176 LBS | HEART RATE: 88 BPM

## 2020-01-27 PROCEDURE — 99204 OFFICE O/P NEW MOD 45 MIN: CPT | Performed by: INTERNAL MEDICINE

## 2020-01-27 NOTE — PROGRESS NOTES
[facility] PROFESSIONAL SERVS  [facility] [employer] [facility] [address], [address]  Dept: [phone]  Dept Fax: [phone]      Chief Complaint   Patient presents with    Hypothyroidism    New Patient   cant concen, tired HA cant function    Patient presents for evaluation of hypothyroidism. I have never seen the patient before. This patient is followed regularly by   Bala Sher CNP  Patient was recently started on thyroid medications  She said she cannot concentrate  She said she is tired has a headache  Cannot function normally  Thyroid ultrasound showed thyromegaly increased vascularity suggestive of thyroiditis there is a cystic structure right thyroid lobe may represent a necrotic lymph node or a parathyroid cyst  Past Medical History:   Diagnosis Date    Depression     Hyperlipidemia     Migraines        Current Outpatient Medications   Medication Sig Dispense Refill    levothyroxine (SYNTHROID) 88 MCG tablet Take 1 tablet by mouth daily 90 tablet 1    vitamin D (ERGOCALCIFEROL) 1.25 MG (92335 UT) CAPS capsule Take 1 capsule by mouth once a week 12 capsule 1    sertraline (ZOLOFT) 100 MG tablet Take 1 tablet by mouth daily 90 tablet 1    RaNITidine HCl (ZANTAC PO) Take by mouth daily as needed       dicyclomine (BENTYL) 10 MG capsule Take 1 capsule by mouth 4 times daily 120 capsule 3    loratadine (CLARITIN) 10 MG tablet Take 1 tablet by mouth daily 30 tablet 3    Acetaminophen (TYLENOL EXTRA STRENGTH PO) Take 1,500 mg by mouth every 6 hours as needed       Ibuprofen-Diphenhydramine Cit (ADVIL PM PO) Take 2 tablets by mouth as needed       SUMAtriptan (IMITREX) 25 MG tablet May take 1 tablet at the onset of a headache may repeat X1 dose,  2 hours later if needed. Not to exceed 2 tablets in 24 hours. 9 tablet 2    topiramate (TOPAMAX) 25 MG tablet Take 1 tablet by mouth 2 times daily 60 tablet 5     No current facility-administered medications for this visit.         Past Surgical History:   Procedure Laterality Date    OTHER SURGICAL HISTORY  11-10-14    diagnostic laparoscopy, lysis of adhesions, cautery of endometriosis    [place]  DELIVERY ONLY          TUBAL LIGATION         No Known Allergies    Social History     Socioeconomic History    Marital status:      Spouse name: Not on file    Number of children: Not on file    Years of education: Not on file    Highest education level: Not on file   Occupational History    Not on file   Social Needs    Financial resource strain: Not on file    Food insecurity:     Worry: Not on file     Inability: Not on file    Transportation needs:     Medical: Not on file     Non-medical: Not on file   Tobacco Use    Smoking status: Current Some Day Smoker     Packs/day: 0.50     Years: 18.00     Pack years: 9.00     Types: Cigarettes     Start date: 1988     Last attempt to quit: 2018     Years since quittin.1    Smokeless tobacco: Never Used   Substance and Sexual Activity    Alcohol use: Not Currently     Alcohol/week: 0.0 standard drinks     Comment: Socially    Drug use: No    Sexual activity: Yes     Partners: Male   Lifestyle    Physical activity:     Days per week: Not on file     Minutes per session: Not on file    Stress: Not on file   Relationships    Social connections:     Talks on phone: Not on file     Gets together: Not on file     Attends Tenriism service: Not on file     Active member of club or organization: Not on file     Attends meetings of clubs or organizations: Not on file     Relationship status: Not on file    Intimate partner violence:     Fear of current or ex partner: Not on file     Emotionally abused: Not on file     Physically abused: Not on file     Forced sexual activity: Not on file   Other Topics Concern    Not on file   Social History Narrative    Not on file       Family History   Problem Relation Age of Onset    Heart Disease Mother     High Blood

## 2020-01-28 ENCOUNTER — TELEPHONE (OUTPATIENT)
Dept: FAMILY MEDICINE CLINIC | Age: 45
End: 2020-01-28

## 2020-01-28 RX ORDER — TOPIRAMATE 25 MG/1
25 TABLET ORAL 2 TIMES DAILY
Qty: 60 TABLET | Refills: 5 | Status: SHIPPED | OUTPATIENT
Start: 2020-01-28 | End: 2020-06-08 | Stop reason: SDUPTHER

## 2020-01-28 RX ORDER — SUMATRIPTAN 25 MG/1
25 TABLET, FILM COATED ORAL
Qty: 1 TABLET | Refills: 0 | Status: SHIPPED | OUTPATIENT
Start: 2020-01-28 | End: 2020-01-29 | Stop reason: SDUPTHER

## 2020-01-29 RX ORDER — SUMATRIPTAN 25 MG/1
TABLET, FILM COATED ORAL
Qty: 9 TABLET | Refills: 2 | Status: SHIPPED | OUTPATIENT
Start: 2020-01-29

## 2020-01-30 ENCOUNTER — TELEPHONE (OUTPATIENT)
Dept: FAMILY MEDICINE CLINIC | Age: 45
End: 2020-01-30

## 2020-01-30 ENCOUNTER — NURSE ONLY (OUTPATIENT)
Dept: LAB | Age: 45
End: 2020-01-30

## 2020-01-30 LAB
T4 FREE: 1.46 NG/DL (ref 0.93–1.76)
TSH SERPL DL<=0.05 MIU/L-ACNC: 5.84 UIU/ML (ref 0.4–4.2)

## 2020-01-30 NOTE — TELEPHONE ENCOUNTER
Pt has been informed and understands and stated that she is scheduled to have her TSH-T4 lab repeated on 02.18.2020 with some labs ordered by Dr Rosalia Feldman.

## 2020-02-03 LAB — THYROID STIMULATING IMMUNOGLOBULIN: 91 %

## 2020-02-06 ENCOUNTER — HOSPITAL ENCOUNTER (OUTPATIENT)
Dept: CT IMAGING | Age: 45
Discharge: HOME | End: 2020-02-06
Payer: COMMERCIAL

## 2020-02-06 PROCEDURE — 70498 CT ANGIOGRAPHY NECK: CPT

## 2020-02-06 PROCEDURE — 6360000004 HC RX CONTRAST MEDICATION: Performed by: INTERNAL MEDICINE

## 2020-02-06 RX ADMIN — IOPAMIDOL 75 ML: 755 INJECTION, SOLUTION INTRAVENOUS at 09:11

## 2020-02-11 ENCOUNTER — PATIENT MESSAGE (OUTPATIENT)
Dept: INTERNAL MEDICINE CLINIC | Age: 45
End: 2020-02-11

## 2020-02-20 ENCOUNTER — NURSE ONLY (OUTPATIENT)
Dept: LAB | Age: 45
End: 2020-02-20

## 2020-02-20 LAB
T4 FREE: 1.16 NG/DL (ref 0.93–1.76)
TSH SERPL DL<=0.05 MIU/L-ACNC: 4.7 UIU/ML (ref 0.4–4.2)
VITAMIN D 25-HYDROXY: 23 NG/ML (ref 30–100)

## 2020-02-23 LAB — THYROID STIMULATING IMMUNOGLOBULIN: < 0.1 IU/L

## 2020-02-24 ENCOUNTER — TELEPHONE (OUTPATIENT)
Dept: FAMILY MEDICINE CLINIC | Age: 45
End: 2020-02-24

## 2020-02-24 RX ORDER — LEVOTHYROXINE SODIUM 0.1 MG/1
100 TABLET ORAL DAILY
Qty: 90 TABLET | Refills: 1 | Status: SHIPPED | OUTPATIENT
Start: 2020-02-24

## 2020-02-24 NOTE — TELEPHONE ENCOUNTER
----- Message from ANGELA Betancourt CNP sent at 2/24/2020  7:44 AM EST -----  Let mikie know her thyroid labs are coming down. It had been 21 1 month ago, today is is 4.700, normal range is 0.400 to 4.20. I have in her chart she is taking 88 mcg of thyroid meds, I would recommend an increase to 100 mcg with a recheck in 6 weeks again. Her vitamin d has improved from 17 to 23, I would recommend she keep taking the high dose vitamin d. Ask her how she has been feeling any better? Let me now if she hs questions.

## 2020-03-02 ENCOUNTER — OFFICE VISIT (OUTPATIENT)
Dept: INTERNAL MEDICINE CLINIC | Age: 45
End: 2020-03-02
Payer: COMMERCIAL

## 2020-03-02 VITALS
HEART RATE: 78 BPM | WEIGHT: 168.8 LBS | DIASTOLIC BLOOD PRESSURE: 75 MMHG | HEIGHT: 65 IN | SYSTOLIC BLOOD PRESSURE: 121 MMHG | BODY MASS INDEX: 28.12 KG/M2

## 2020-03-02 PROCEDURE — 99213 OFFICE O/P EST LOW 20 MIN: CPT | Performed by: INTERNAL MEDICINE

## 2020-03-02 ASSESSMENT — PATIENT HEALTH QUESTIONNAIRE - PHQ9
SUM OF ALL RESPONSES TO PHQ QUESTIONS 1-9: 0
SUM OF ALL RESPONSES TO PHQ QUESTIONS 1-9: 0
SUM OF ALL RESPONSES TO PHQ9 QUESTIONS 1 & 2: 0
1. LITTLE INTEREST OR PLEASURE IN DOING THINGS: 0
2. FEELING DOWN, DEPRESSED OR HOPELESS: 0

## 2020-03-02 NOTE — PROGRESS NOTES
[facility] PROFESSIONAL SERVS  [facility] [place] [address], [address] [facility]  Dept: [phone]  Dept Fax: [phone]      Chief Complaint   Patient presents with    Hypothyroidism     5 WEEK F/U       Patient presents for evaluation of hypothyroidism. I I saw her 5 weeks ago   This patient is followed regularly by   Yolie Santos CNP  Patient was recently started on thyroid medications  She said she cannot concentrate  She said she is tired has a headache  Cannot function normally  Thyroid ultrasound showed thyromegaly increased vascularity suggestive of thyroiditis there is a cystic structure right thyroid lobe may represent a necrotic lymph node or a parathyroid cyst  Past Medical History:   Diagnosis Date    Depression     Hyperlipidemia     Migraines        Current Outpatient Medications   Medication Sig Dispense Refill    levothyroxine (SYNTHROID) 100 MCG tablet Take 1 tablet by mouth daily 90 tablet 1    SUMAtriptan (IMITREX) 25 MG tablet May take 1 tablet at the onset of a headache may repeat X1 dose,  2 hours later if needed. Not to exceed 2 tablets in 24 hours. 9 tablet 2    topiramate (TOPAMAX) 25 MG tablet Take 1 tablet by mouth 2 times daily 60 tablet 5    vitamin D (ERGOCALCIFEROL) 1.25 MG (35675 UT) CAPS capsule Take 1 capsule by mouth once a week 12 capsule 1    sertraline (ZOLOFT) 100 MG tablet Take 1 tablet by mouth daily 90 tablet 1    RaNITidine HCl (ZANTAC PO) Take by mouth daily as needed       dicyclomine (BENTYL) 10 MG capsule Take 1 capsule by mouth 4 times daily 120 capsule 3    loratadine (CLARITIN) 10 MG tablet Take 1 tablet by mouth daily 30 tablet 3    Acetaminophen (TYLENOL EXTRA STRENGTH PO) Take 1,500 mg by mouth every 6 hours as needed       Ibuprofen-Diphenhydramine Cit (ADVIL PM PO) Take 2 tablets by mouth as needed        No current facility-administered medications for this visit.           Review of Systems -as above  Blood pressure 121/75, pulse 78, height 5' 5\" (1.651 m), weight 168 lb 12.8 oz (76.6 kg), not currently breastfeeding. Physical Examination: Alert and oriented no thyromegaly palpable          Diagnosis Orders   1. Hypothyroidism, unspecified type  Calcium, Ionized    PTH, Intact   2. Thyroid nodule  External Referral To Endocrinology   3. Thyroiditis     4.  Neck mass         Orders Placed This Encounter   Procedures    Calcium, Ionized     Standing Status:   Future     Standing Expiration Date:   3/2/2021    PTH, Intact     Standing Status:   Future     Standing Expiration Date:   3/2/2021    External Referral To Endocrinology     Referral Priority:   Routine     Referral Type:   Eval and Treat     Referral Reason:   Specialty Services Required     Requested Specialty:   Endocrinology     Number of Visits Requested:   1       TSH on 2/20 was elevated at 4.7  Free T4 was normal  She has subclinical hypothyroidism  Synthroid dose was increased  CT of the neck on 2/26 showed a 1.3 cm low-density cystic lesion posterior aspect of the right thyroid lobe cannot rule out a parathyroid adenoma  We will check PTH ionized calcium      Repeat TSH in a month

## 2020-06-08 RX ORDER — TOPIRAMATE 50 MG/1
50 TABLET, FILM COATED ORAL 2 TIMES DAILY
Qty: 180 TABLET | Refills: 1 | Status: SHIPPED | OUTPATIENT
Start: 2020-06-08 | End: 2021-01-21

## 2020-06-15 ENCOUNTER — NURSE ONLY (OUTPATIENT)
Dept: LAB | Age: 45
End: 2020-06-15

## 2020-06-15 LAB
T4 FREE: 1.33 NG/DL (ref 0.93–1.76)
TSH SERPL DL<=0.05 MIU/L-ACNC: 0.95 UIU/ML (ref 0.4–4.2)

## 2020-06-16 LAB — T3 FREE: 2.6 PG/ML (ref 2.02–4.43)

## 2020-06-17 ENCOUNTER — TELEPHONE (OUTPATIENT)
Dept: FAMILY MEDICINE CLINIC | Age: 45
End: 2020-06-17

## 2020-06-17 RX ORDER — OXYBUTYNIN CHLORIDE 5 MG/1
5 TABLET, EXTENDED RELEASE ORAL DAILY
Qty: 90 TABLET | Refills: 3 | Status: SHIPPED | OUTPATIENT
Start: 2020-06-17 | End: 2021-06-18

## 2020-10-22 ENCOUNTER — HOSPITAL ENCOUNTER (OUTPATIENT)
Age: 45
Discharge: HOME | End: 2020-10-22
Payer: COMMERCIAL

## 2020-10-22 ENCOUNTER — TELEPHONE (OUTPATIENT)
Dept: FAMILY MEDICINE CLINIC | Age: 45
End: 2020-10-22

## 2020-10-22 ENCOUNTER — VIRTUAL VISIT (OUTPATIENT)
Dept: FAMILY MEDICINE CLINIC | Age: 45
End: 2020-10-22
Payer: COMMERCIAL

## 2020-10-22 DIAGNOSIS — J02.9 SORE THROAT: ICD-10-CM

## 2020-10-22 DIAGNOSIS — R49.0 HOARSE: ICD-10-CM

## 2020-10-22 DIAGNOSIS — R50.81 FEVER IN OTHER DISEASES: ICD-10-CM

## 2020-10-22 LAB
GROUP A STREP CULTURE, REFLEX: NEGATIVE
REFLEX THROAT C + S: NORMAL

## 2020-10-22 PROCEDURE — 87880 STREP A ASSAY W/OPTIC: CPT

## 2020-10-22 PROCEDURE — 99211 OFF/OP EST MAY X REQ PHY/QHP: CPT

## 2020-10-22 PROCEDURE — 87070 CULTURE OTHR SPECIMN AEROBIC: CPT

## 2020-10-22 PROCEDURE — 99213 OFFICE O/P EST LOW 20 MIN: CPT | Performed by: NURSE PRACTITIONER

## 2020-10-22 PROCEDURE — U0003 INFECTIOUS AGENT DETECTION BY NUCLEIC ACID (DNA OR RNA); SEVERE ACUTE RESPIRATORY SYNDROME CORONAVIRUS 2 (SARS-COV-2) (CORONAVIRUS DISEASE [COVID-19]), AMPLIFIED PROBE TECHNIQUE, MAKING USE OF HIGH THROUGHPUT TECHNOLOGIES AS DESCRIBED BY CMS-2020-01-R: HCPCS

## 2020-10-22 RX ORDER — GUAIFENESIN 600 MG/1
600 TABLET, EXTENDED RELEASE ORAL 2 TIMES DAILY
Qty: 30 TABLET | Refills: 0 | Status: SHIPPED | OUTPATIENT
Start: 2020-10-22 | End: 2020-11-06

## 2020-10-22 RX ORDER — PREDNISONE 20 MG/1
20 TABLET ORAL 2 TIMES DAILY
Qty: 10 TABLET | Refills: 0 | Status: SHIPPED | OUTPATIENT
Start: 2020-10-22 | End: 2020-10-27

## 2020-10-22 ASSESSMENT — ENCOUNTER SYMPTOMS
VOICE CHANGE: 1
SINUS PRESSURE: 0
RHINORRHEA: 0
COUGH: 1
SORE THROAT: 1
WHEEZING: 0
CHOKING: 0
TROUBLE SWALLOWING: 0
SHORTNESS OF BREATH: 0
SINUS PAIN: 0

## 2020-10-22 NOTE — TELEPHONE ENCOUNTER
----- Message from ANGELA Ochoa CNP sent at 10/22/2020  1:07 PM EDT -----  Let pt know her strep test is negative, will get back in touch with her when covid test is back

## 2020-10-22 NOTE — PROGRESS NOTES
10/22/2020    TELEHEALTH EVALUATION -- Audio/Visual (During AOAJP-22 public health emergency)    HPI:visit conducted with pt at home and provider Saroj Rebolledo CNP in office. Fifi Mejia (:  1975) has requested an audio/video evaluation for the following concern(s):    Sore Throat    HPI:      Symptoms have been present for 4 day(s). Fever? Yes  Odynophagia? No  Dysphagia? Yes  Cough? Yes  Rhinitis? No  Hx of EBV? No  Sick Contacts? No    Pt denies SOB, wheezing, stridor, nausea or vomiting. Pt has not had any fever reducing meds, is hoarse, does have a history of strep throat. Review of Systems   Constitutional: Positive for fatigue and fever. Negative for chills. HENT: Positive for congestion, sore throat and voice change. Negative for postnasal drip, rhinorrhea, sinus pressure, sinus pain, tinnitus and trouble swallowing. Respiratory: Positive for cough. Negative for choking, shortness of breath and wheezing. Cardiovascular: Negative. Musculoskeletal: Positive for myalgias. Neurological: Negative for dizziness and headaches. Prior to Visit Medications    Medication Sig Taking?  Authorizing Provider   predniSONE (DELTASONE) 20 MG tablet Take 1 tablet by mouth 2 times daily for 5 days Yes ANGELA Keith CNP   guaiFENesin (MUCINEX) 600 MG extended release tablet Take 1 tablet by mouth 2 times daily for 15 days Yes ANGELA Keith CNP   vitamin D (ERGOCALCIFEROL) 1.25 MG (90574 UT) CAPS capsule Take 1 capsule by mouth once a week  ANGELA Curiel CNP   sertraline (ZOLOFT) 100 MG tablet Take 1 tablet by mouth once daily  ANGELA Keith CNP   oxybutynin (DITROPAN XL) 5 MG extended release tablet Take 1 tablet by mouth daily  ANGELA Curiel CNP   topiramate (TOPAMAX) 50 MG tablet Take 1 tablet by mouth 2 times daily  ANGELA Curiel CNP   levothyroxine (SYNTHROID) 100 MCG tablet Take 1 tablet by mouth daily  ANGELA Keith CNP SUMAtriptan (IMITREX) 25 MG tablet May take 1 tablet at the onset of a headache may repeat X1 dose,  2 hours later if needed. Not to exceed 2 tablets in 24 hours.   ANGELA Oshea CNP   RaNITidine HCl (ZANTAC PO) Take by mouth daily as needed   Historical Provider, MD   dicyclomine (BENTYL) 10 MG capsule Take 1 capsule by mouth 4 times daily  ANGELA Curiel CNP   loratadine (CLARITIN) 10 MG tablet Take 1 tablet by mouth daily  ANGELA Curiel CNP   Acetaminophen (TYLENOL EXTRA STRENGTH PO) Take 1,500 mg by mouth every 6 hours as needed   Historical Provider, MD   Ibuprofen-Diphenhydramine Cit (ADVIL PM PO) Take 2 tablets by mouth as needed   Historical Provider, MD       Social History     Tobacco Use    Smoking status: Current Some Day Smoker     Packs/day: 0.50     Years: 18.00     Pack years: 9.00     Types: Cigarettes     Start date: 1988     Last attempt to quit: 2018     Years since quittin.8    Smokeless tobacco: Never Used   Substance Use Topics    Alcohol use: Not Currently     Alcohol/week: 0.0 standard drinks     Comment: Socially    Drug use: No        No Known Allergies,   Past Medical History:   Diagnosis Date    Depression     Hyperlipidemia     Migraines    ,   Past Surgical History:   Procedure Laterality Date    OTHER SURGICAL HISTORY  11-10-14    diagnostic laparoscopy, lysis of adhesions, cautery of endometriosis    [place]  DELIVERY ONLY          TUBAL LIGATION     ,   Social History     Tobacco Use    Smoking status: Current Some Day Smoker     Packs/day: 0.50     Years: 18.00     Pack years: 9.00     Types: Cigarettes     Start date: 1988     Last attempt to quit: 2018     Years since quittin.8    Smokeless tobacco: Never Used   Substance Use Topics    Alcohol use: Not Currently     Alcohol/week: 0.0 standard drinks     Comment: Socially    Drug use: No   ,   Family History   Problem Relation Age of Onset    Heart hoarse voice   Musculoskeletal:   [] Normal gait with no signs of ataxia         [] Normal range of motion of neck        [] Abnormal-       Neurological:        [] No Facial Asymmetry (Cranial nerve 7 motor function) (limited exam to video visit)          [] No gaze palsy        [] Abnormal-         Skin:        [x] No significant exanthematous lesions or discoloration noted on facial skin         [] Abnormal-            Psychiatric:       [] Normal Affect [] No Hallucinations        [] Abnormal-     Other pertinent observable physical exam findings-     ASSESSMENT/PLAN:  1. Sore throat  Rule out strep and covid  Warm salt water gargles  Tea with honey  - Group A Strep, Reflex  - COVID-19 Ambulatory; Future    2. Fever in other diseases  Tylenol or motrin for fever or pain  - Group A Strep, Reflex  - COVID-19 Ambulatory; Future    3. Hoarse  Prednisone bid  - Group A Strep, Reflex  - COVID-19 Ambulatory; Future  Quarantine until covid test is back  Pt in agreement with plan    Return if symptoms worsen or fail to improve. Daphney Ching is a 54 y.o. female being evaluated by a Virtual Visit (video visit) encounter to address concerns as mentioned above. A caregiver was present when appropriate. Due to this being a TeleHealth encounter (During [facility]- public health emergency), evaluation of the following organ systems was limited: Vitals/Constitutional/EENT/Resp/CV/GI//MS/Neuro/Skin/Heme-Lymph-Imm. Pursuant to the emergency declaration under the [address] [place] [address] and the [employer] and [employer] Act, this Virtual Visit was conducted with patient's (and/or legal guardian's) consent, to reduce the patient's risk of exposure to COVID-19 and provide necessary medical care.   The patient (and/or legal guardian) has also been advised to contact this office for worsening conditions or problems, and seek emergency medical

## 2020-10-24 LAB
SARS-COV-2: NOT DETECTED
THROAT/NOSE CULTURE: NORMAL

## 2020-10-26 ENCOUNTER — TELEPHONE (OUTPATIENT)
Dept: FAMILY MEDICINE CLINIC | Age: 45
End: 2020-10-26

## 2020-10-26 RX ORDER — AMOXICILLIN 500 MG/1
500 CAPSULE ORAL 3 TIMES DAILY
Qty: 30 CAPSULE | Refills: 0 | Status: SHIPPED | OUTPATIENT
Start: 2020-10-26 | End: 2020-11-05

## 2020-10-26 NOTE — TELEPHONE ENCOUNTER
----- Message from ANGELA Cardoza CNP sent at 10/26/2020  8:06 AM EDT -----  Let pt know her throat culture came back negative and her covid test is negative. Let me know if she has any questions.

## 2020-11-04 ENCOUNTER — PATIENT MESSAGE (OUTPATIENT)
Dept: FAMILY MEDICINE CLINIC | Age: 45
End: 2020-11-04

## 2020-11-04 NOTE — TELEPHONE ENCOUNTER
From: Leah Way  To: ANGELA Oshea - CNP  Sent: 11/4/2020 4:36 PM EST  Subject: Visit Follow-Up Question    Azeem Choudhury   I had forgotten that while I was off we have a policy that if we are out sick for three days we need a release from the dr to return. I did return to the office on October 27th could you fax me a release form?  The fax number is [phone]  Thank you

## 2020-11-04 NOTE — LETTER
[address] [facility]  [address] [place] [id number]  Phone: [phone]  Fax: [phone],       November 5, 2020     Patient: Tiffanie Gregory   YOB: 1975   Date of Visit: 11/4/2020       To Whom It May Concern: It is my medical opinion that Iker Grant may return to work on 10/27/2020. If you have any questions or concerns, please don't hesitate to call.     Sincerely,        Boaz Herrera DO

## 2020-11-22 ENCOUNTER — APPOINTMENT (OUTPATIENT)
Dept: ULTRASOUND IMAGING | Age: 45
End: 2020-11-22
Payer: COMMERCIAL

## 2020-11-22 ENCOUNTER — HOSPITAL ENCOUNTER (EMERGENCY)
Age: 45
Discharge: HOME | End: 2020-11-22
Attending: FAMILY MEDICINE
Payer: COMMERCIAL

## 2020-11-22 VITALS
DIASTOLIC BLOOD PRESSURE: 83 MMHG | OXYGEN SATURATION: 97 % | HEIGHT: 65 IN | WEIGHT: 150 LBS | SYSTOLIC BLOOD PRESSURE: 141 MMHG | TEMPERATURE: 98.3 F | HEART RATE: 60 BPM | BODY MASS INDEX: 24.99 KG/M2 | RESPIRATION RATE: 16 BRPM

## 2020-11-22 LAB
ALBUMIN SERPL-MCNC: 3.9 G/DL (ref 3.5–5.1)
ALP BLD-CCNC: 60 U/L (ref 38–126)
ALT SERPL-CCNC: 7 U/L (ref 11–66)
ANION GAP SERPL CALCULATED.3IONS-SCNC: 11 MEQ/L (ref 8–16)
AST SERPL-CCNC: 14 U/L (ref 5–40)
BASOPHILS # BLD: 0.4 %
BASOPHILS ABSOLUTE: 0 THOU/MM3 (ref 0–0.1)
BILIRUB SERPL-MCNC: 0.5 MG/DL (ref 0.3–1.2)
BUN BLDV-MCNC: 9 MG/DL (ref 7–22)
CALCIUM SERPL-MCNC: 9.2 MG/DL (ref 8.5–10.5)
CHLORIDE BLD-SCNC: 103 MEQ/L (ref 98–111)
CO2: 23 MEQ/L (ref 23–33)
CREAT SERPL-MCNC: 0.8 MG/DL (ref 0.4–1.2)
EKG ATRIAL RATE: 86 BPM
EKG P AXIS: 77 DEGREES
EKG P-R INTERVAL: 124 MS
EKG Q-T INTERVAL: 334 MS
EKG QRS DURATION: 88 MS
EKG QTC CALCULATION (BAZETT): 399 MS
EKG R AXIS: 82 DEGREES
EKG T AXIS: 50 DEGREES
EKG VENTRICULAR RATE: 86 BPM
EOSINOPHIL # BLD: 0.4 %
EOSINOPHILS ABSOLUTE: 0 THOU/MM3 (ref 0–0.4)
ERYTHROCYTE [DISTWIDTH] IN BLOOD BY AUTOMATED COUNT: 12.4 % (ref 11.5–14.5)
ERYTHROCYTE [DISTWIDTH] IN BLOOD BY AUTOMATED COUNT: 42.5 FL (ref 35–45)
GFR SERPL CREATININE-BSD FRML MDRD: 77 ML/MIN/1.73M2
GLUCOSE BLD-MCNC: 84 MG/DL (ref 70–108)
HCT VFR BLD CALC: 44.1 % (ref 37–47)
HEMOGLOBIN: 14.7 GM/DL (ref 12–16)
IMMATURE GRANS (ABS): 0.04 THOU/MM3 (ref 0–0.07)
IMMATURE GRANULOCYTES: 0.4 %
LIPASE: 18.1 U/L (ref 5.6–51.3)
LYMPHOCYTES # BLD: 16.7 %
LYMPHOCYTES ABSOLUTE: 1.5 THOU/MM3 (ref 1–4.8)
MCH RBC QN AUTO: 31 PG (ref 26–33)
MCHC RBC AUTO-ENTMCNC: 33.3 GM/DL (ref 32.2–35.5)
MCV RBC AUTO: 93 FL (ref 81–99)
MONOCYTES # BLD: 9.6 %
MONOCYTES ABSOLUTE: 0.9 THOU/MM3 (ref 0.4–1.3)
NUCLEATED RED BLOOD CELLS: 0 /100 WBC
OSMOLALITY CALCULATION: 271.7 MOSMOL/KG (ref 275–300)
PLATELET # BLD: 314 THOU/MM3 (ref 130–400)
PMV BLD AUTO: 9.9 FL (ref 9.4–12.4)
POTASSIUM SERPL-SCNC: 3.7 MEQ/L (ref 3.5–5.2)
PRO-BNP: 91.8 PG/ML (ref 0–450)
RBC # BLD: 4.74 MILL/MM3 (ref 4.2–5.4)
SEG NEUTROPHILS: 72.5 %
SEGMENTED NEUTROPHILS ABSOLUTE COUNT: 6.6 THOU/MM3 (ref 1.8–7.7)
SODIUM BLD-SCNC: 137 MEQ/L (ref 135–145)
TOTAL PROTEIN: 6.2 G/DL (ref 6.1–8)
TROPONIN T: < 0.01 NG/ML
WBC # BLD: 9.1 THOU/MM3 (ref 4.8–10.8)

## 2020-11-22 PROCEDURE — 80053 COMPREHEN METABOLIC PANEL: CPT

## 2020-11-22 PROCEDURE — 93005 ELECTROCARDIOGRAM TRACING: CPT | Performed by: STUDENT IN AN ORGANIZED HEALTH CARE EDUCATION/TRAINING PROGRAM

## 2020-11-22 PROCEDURE — 85025 COMPLETE CBC W/AUTO DIFF WBC: CPT

## 2020-11-22 PROCEDURE — 76705 ECHO EXAM OF ABDOMEN: CPT

## 2020-11-22 PROCEDURE — 83880 ASSAY OF NATRIURETIC PEPTIDE: CPT

## 2020-11-22 PROCEDURE — 83690 ASSAY OF LIPASE: CPT

## 2020-11-22 PROCEDURE — 84484 ASSAY OF TROPONIN QUANT: CPT

## 2020-11-22 PROCEDURE — 99283 EMERGENCY DEPT VISIT LOW MDM: CPT

## 2020-11-22 PROCEDURE — 36415 COLL VENOUS BLD VENIPUNCTURE: CPT

## 2020-11-22 ASSESSMENT — PAIN DESCRIPTION - DESCRIPTORS: DESCRIPTORS: SHARP;DULL

## 2020-11-22 ASSESSMENT — PAIN DESCRIPTION - LOCATION: LOCATION: CHEST

## 2020-11-22 ASSESSMENT — ENCOUNTER SYMPTOMS
ABDOMINAL DISTENTION: 0
ABDOMINAL PAIN: 1
CHEST TIGHTNESS: 1
DIARRHEA: 0
FACIAL SWELLING: 0
COUGH: 1
WHEEZING: 0
SINUS PRESSURE: 0
NAUSEA: 1
RHINORRHEA: 0
VOMITING: 1
CONSTIPATION: 0
SORE THROAT: 0
SHORTNESS OF BREATH: 0
SINUS PAIN: 0
BLOOD IN STOOL: 0

## 2020-11-22 ASSESSMENT — PAIN DESCRIPTION - FREQUENCY: FREQUENCY: CONTINUOUS

## 2020-11-22 ASSESSMENT — PAIN DESCRIPTION - PAIN TYPE: TYPE: ACUTE PAIN

## 2020-11-22 ASSESSMENT — PAIN SCALES - GENERAL: PAINLEVEL_OUTOF10: 6

## 2020-11-22 ASSESSMENT — PAIN DESCRIPTION - ORIENTATION: ORIENTATION: RIGHT

## 2020-11-22 ASSESSMENT — PAIN DESCRIPTION - PROGRESSION: CLINICAL_PROGRESSION: GRADUALLY WORSENING

## 2020-11-22 ASSESSMENT — PAIN DESCRIPTION - ONSET: ONSET: ON-GOING

## 2020-11-22 NOTE — ED PROVIDER NOTES
[address] [facility] COMPLAINT  Chief Complaint   Patient presents with    Chest Pain    Rib Pain       Nurses Notes reviewed and I agree except as noted in the HPI. HPI     Ruth Vidal is a 54 y.o. female who presents for evaluation of chest tightness and right upper quadrant pain. She has a past medical history of hyperlipidemia, Hashimoto's thyroiditis, migraines, and depression. She also states that she is a chronic smoker, and is still currently smoking. She does state that her mother  suddenly of cardiac arrest at about the age of 48. She states that she has been having this chest tightness and right upper quadrant pain for about 3 weeks at this point the chest tightness is present more often than not, but does go away eventually. She does state her chronic smoker cough with that. She has not brought sputum up. She states that the right upper quadrant pain is not always present, and occurs mostly after she eats meals and is more intense in the evening hours. She denies any radiation to behind her shoulder blades. Or any radiation to other parts of her abdomen. She does states some nausea and vomiting with this pain. But denies any diarrhea or constipation. She denies fever, chills, shortness of breath, explicit chest pain, swelling in her lower limbs, or current symptoms of URI. She has not taken anything for the pain is at this moment, and states that whenever she does have a headache pain medication she uses for that have not taken care of the pain in her stomach. She does make it clear that the chest tightness is tightness and not pain. She describes the right upper quadrant pain at its most intense is being a stabbing nature, rating about 8 out of 10 on the pain scale. All other times is more of a dull ache. She does state that yesterday she was incredibly fatigued and slept all day.   She is unaware if this symptom is related to the others. REVIEW OF SYSTEMS  Review of Systems   Constitutional: Positive for fatigue (Slept all day yesterday). Negative for activity change, appetite change, chills, diaphoresis and fever. HENT: Negative for congestion, facial swelling, postnasal drip, rhinorrhea, sinus pressure, sinus pain, sneezing, sore throat and tinnitus. Respiratory: Positive for cough (Chronic cough) and chest tightness. Negative for shortness of breath and wheezing. Cardiovascular: Negative for chest pain, palpitations and leg swelling. Gastrointestinal: Positive for abdominal pain (Right upper quadrant pain), nausea and vomiting. Negative for abdominal distention, blood in stool, constipation and diarrhea. Genitourinary: Negative for difficulty urinating, dysuria, frequency, hematuria and urgency. Musculoskeletal: Negative for arthralgias, joint swelling, myalgias, neck pain and neck stiffness. Skin: Negative for pallor, rash and wound. Neurological: Positive for numbness (Occasionally in her hands. ). Negative for dizziness, tremors, syncope, weakness, light-headedness and headaches. PAST MEDICAL HISTORY   has a past medical history of Depression, Hyperlipidemia, and Migraines. SURGICAL HISTORY   has a past surgical history that includes pr  delivery only; Tubal ligation; and other surgical history (11-10-14).     CURRENT MEDICATIONS  Discharge Medication List as of 2020  1:52 PM      CONTINUE these medications which have NOT CHANGED    Details   vitamin D (ERGOCALCIFEROL) 1.25 MG (42005 UT) CAPS capsule Take 1 capsule by mouth once a week, Disp-12 capsule,R-3Normal      sertraline (ZOLOFT) 100 MG tablet Take 1 tablet by mouth once daily, Disp-90 tablet,R-4Normal      oxybutynin (DITROPAN XL) 5 MG extended release tablet Take 1 tablet by mouth daily, Disp-90 tablet,R-3Normal      topiramate (TOPAMAX) 50 MG tablet Take 1 tablet by mouth 2 times daily, Disp-180 tablet,R-1Normal      levothyroxine (SYNTHROID) 100 MCG tablet Take 1 tablet by mouth daily, Disp-90 tablet, R-1Normal      SUMAtriptan (IMITREX) 25 MG tablet May take 1 tablet at the onset of a headache may repeat X1 dose,  2 hours later if needed. Not to exceed 2 tablets in 24 hours. , Disp-9 tablet, R-2Normal      RaNITidine HCl (ZANTAC PO) Take by mouth daily as needed Historical Med      dicyclomine (BENTYL) 10 MG capsule Take 1 capsule by mouth 4 times daily, Disp-120 capsule, R-3Normal      loratadine (CLARITIN) 10 MG tablet Take 1 tablet by mouth daily, Disp-30 tablet, R-3Normal      Acetaminophen (TYLENOL EXTRA STRENGTH PO) Take 1,500 mg by mouth every 6 hours as needed       Ibuprofen-Diphenhydramine Cit (ADVIL PM PO) Take 2 tablets by mouth as needed              ALLERGIES  has No Known Allergies. FAMILY HISTORY  She indicated that her mother is . She indicated that her father is . She indicated that her sister is alive. She indicated that three of her four brothers are alive. She indicated that her maternal grandmother is . She indicated that her maternal grandfather is . She indicated that her paternal grandmother is . She indicated that her paternal grandfather is . family history includes Diabetes in her paternal grandmother; Heart Disease in her brother and mother; High Blood Pressure in her mother; Other in her maternal grandfather, maternal grandmother, mother, and paternal grandmother. SOCIAL HISTORY   reports that she has been smoking cigarettes. She started smoking about 32 years ago. She has a 9.00 pack-year smoking history. She has never used smokeless tobacco. She reports previous alcohol use. She reports that she does not use drugs.     PHYSICAL EXAM     INITIAL VITALS: BP (!) 141/83   Pulse 60   Temp 98.3 °F (36.8 °C) (Oral)   Resp 16   Ht 5' 5\" (1.651 m)   Wt 150 lb (68 kg)   SpO2 97%   BMI 24.96 kg/m² Estimated body mass index is 24.96 kg/m² as calculated from the following:    Height as of this encounter: 5' 5\" (1.651 m). Weight as of this encounter: 150 lb (68 kg). Physical Exam  Vitals signs and nursing note reviewed. Constitutional:       General: She is not in acute distress. Appearance: Normal appearance. She is normal weight. She is not ill-appearing, toxic-appearing or diaphoretic. HENT:      Head: Normocephalic and atraumatic. Right Ear: External ear normal.      Left Ear: External ear normal.      Nose: Nose normal. No congestion or rhinorrhea. Mouth/Throat:      Mouth: Mucous membranes are moist.      Pharynx: Oropharynx is clear. No oropharyngeal exudate or posterior oropharyngeal erythema. Eyes:      General: No scleral icterus. Extraocular Movements: Extraocular movements intact. Conjunctiva/sclera: Conjunctivae normal.      Pupils: Pupils are equal, round, and reactive to light. Neck:      Musculoskeletal: Normal range of motion and neck supple. No muscular tenderness. Cardiovascular:      Rate and Rhythm: Normal rate and regular rhythm. Pulses: Normal pulses. Heart sounds: Normal heart sounds. No murmur. No friction rub. No gallop. Pulmonary:      Effort: Pulmonary effort is normal.      Breath sounds: Wheezing (Mild diffuse wheezes) present. No rhonchi or rales. Abdominal:      General: Abdomen is flat. Bowel sounds are normal. There is no distension. Palpations: Abdomen is soft. There is no mass. Tenderness: There is abdominal tenderness (Incredible tenderness to the right upper quadrant, reaching around to the epigastric area, when patient is asked to inhale right upper quadrant is palpated she has an increase in pain. ). There is no right CVA tenderness or left CVA tenderness. Hernia: No hernia is present. Musculoskeletal: Normal range of motion. General: No swelling or tenderness. Right lower leg: No edema.       Left lower leg: No edema.   Lymphadenopathy:      Cervical: No cervical adenopathy. Skin:     General: Skin is warm and dry. Capillary Refill: Capillary refill takes less than 2 seconds. Coloration: Skin is not jaundiced or pale. Findings: No lesion or rash. Neurological:      General: No focal deficit present. Mental Status: She is alert and oriented to person, place, and time. Mental status is at baseline. Cranial Nerves: No cranial nerve deficit. Motor: No weakness. Psychiatric:         Mood and Affect: Mood normal.         Behavior: Behavior normal.         MEDICAL DECISION MAKING  Review of past medical records show a history of Hashimoto's thyroiditis, and hyperlipidemia. The patient also states that she is a chronic everyday smoker. She is also worried about the fact that her mother  of a heart attack at about 48. She has had a normal ultrasound of the gallbladder about a year ago. Initial assessment: Stable appearing adult female, who does have pain with palpation, her O2 sats look okay on room air. She does have some mild wheezing present. Plan: Work-up for cardiac origin of chest tightness as well as source of right upper quadrant pain.    CMP (ALT 7)   CBC (Within normal limits)   Troponin (<0.010)   BNP (91.8)   Lipase (18.1)   EKG (see below)   RUQ US (No gallstones, some gallbladder wall thickening, positive Pierre's sign, hemangioma of left lobe of liver)    DIFFERENTIAL DIAGNOSIS:  Biliary colic  Cholelithiasis  Choledocholithiasis  Stable angina  Mild pancreatitis      DIAGNOSTIC RESULTS    EKG    Rhythm: NSR  Rate: 86  Axis: Normal  UT Interval: 124  QRS: 88  QTc: 399  Ectopy: None seen  Conduction: SA Nina  ST Segments: Potential depression in V3 and V4 (downward baseline makes it difficult to tell)  T Waves: No peaking  Q Waves: None seen    Clinical Impression: Normal sinus rhythm with no acute changes/normal EKG      RADIOLOGY:  I have reviewed radiologic plain film image(s). The plain films will be read or over read by the radiologist.All other non-plain film images(s) such as CT, Ultrasound and MRI have been read by the radiologist.  [address]   Final Result       1. There are no gallstones. There is some gallbladder wall thickening and a possible positive Pierre's sign. 2. Small hemangioma in the left lobe of the liver. 3. Otherwise negative gallbladder ultrasound. .               **This report has been created using voice recognition software. It may contain minor errors which are inherent in voice recognition technology. **      Final report electronically signed by DR Woody Mosqueda on 11/22/2020 12:57 PM          LABS:  Labs Reviewed   COMPREHENSIVE METABOLIC PANEL - Abnormal; Notable for the following components:       Result Value    ALT 7 (*)     All other components within normal limits   OSMOLALITY - Abnormal; Notable for the following components:    Osmolality Calc 271.7 (*)     All other components within normal limits   GLOMERULAR FILTRATION RATE, ESTIMATED - Abnormal; Notable for the following components:    Est, Glom Filt Rate 77 (*)     All other components within normal limits   CBC WITH AUTO DIFFERENTIAL   TROPONIN   BRAIN NATRIURETIC PEPTIDE   LIPASE   ANION GAP     All other unresulted laboratory test above are normal:    Vitals:    Vitals:    11/22/20 1044 11/22/20 1255   BP: (!) 141/83    Pulse: 98 60   Resp: 16    Temp: 98.3 °F (36.8 °C)    TempSrc: Oral    SpO2: 99% 97%   Weight: 150 lb (68 kg)    Height: 5' 5\" (1.651 m)        EMERGENCY DEPARTMENT COURSE:    Medications - No data to display         The patient was seen and evaluated by me. Within the department, I observed the patient's vital signs to be within acceptable range. She was mildly hypertensive on admission. May have been caused due to pain. Laboratory and radiological studies were performed, results were reviewed with the patient.   I observed the patient's condition to remain stable during the duration of their stay. I explained my proposed course of treatment to the patient, and they were amenable to my decision. They were discharged home, and they will return to the ED if their symptoms become more severe in nature, or otherwise change. The results were discussed with the patient, including a tentative diagnosis of biliary colic. We discussed with the patient the need to follow-up with her primary care provider, as well as to potentially get into a GI doctor for further work-up and treatment. We advised her to take Tylenol for pain as needed. We also advised the patient on proper dieting techniques including limiting fatty foods. We did inform the patient that this is likely to be a chronic issue at this time, however he does not warrant admission due to the nonemergent matter of the pain. The patient is agreeable to this and states that she will follow up outpatient. Controlled Substances Monitoring:       CRITICAL CARE:  None. CONSULTS:  None. PROCEDURES:  None. FINAL IMPRESSION:  1. Thickening of wall of gallbladder    2. Abdominal pain, right upper quadrant        DISPOSITION/PLAN:  PATIENT REFERRED TO:  ANGELA Fan - [facility] [place] [address]  [phone]      As needed for follow-up for RUQ. DISCHARGE MEDICATIONS:  Discharge Medication List as of 11/22/2020  1:52 PM            (Please note that portions of this note were completed with a voice recognition program and electronically transcribed. Efforts were made to edit the dictations but occasionally words are mis-transcribed. This transcription may contain errors not detected in proofreading.  This transcription was electronically signed.)    Electronically signed by Jaiden Bellamy DO on 11/22/2020 at 2:11 PM     Jaiden Bellamy DO  Resident  11/22/20 5754

## 2020-11-22 NOTE — ED NOTES
Vitals as charted at this time. No distress noted.  Updated patient on 6624 Kanu Byrne RN  11/22/20 9180

## 2020-11-22 NOTE — ED NOTES
Pt presents to the ER for chest pain and R side pain. The pain started 3 weeks ago and is not getting any better.      Mynor Fails  11/22/20 1046

## 2020-11-24 ENCOUNTER — TELEPHONE (OUTPATIENT)
Dept: FAMILY MEDICINE CLINIC | Age: 45
End: 2020-11-24

## 2020-11-24 ENCOUNTER — TELEMEDICINE (OUTPATIENT)
Dept: FAMILY MEDICINE CLINIC | Age: 45
End: 2020-11-24
Payer: COMMERCIAL

## 2020-11-24 PROCEDURE — 99214 OFFICE O/P EST MOD 30 MIN: CPT | Performed by: NURSE PRACTITIONER

## 2020-11-24 RX ORDER — PAROXETINE HYDROCHLORIDE 20 MG/1
20 TABLET, FILM COATED ORAL DAILY
Qty: 30 TABLET | Refills: 3 | Status: SHIPPED | OUTPATIENT
Start: 2020-11-24 | End: 2021-05-25 | Stop reason: SDUPTHER

## 2020-11-24 ASSESSMENT — ENCOUNTER SYMPTOMS
CONSTIPATION: 0
ABDOMINAL DISTENTION: 0
ABDOMINAL PAIN: 1
WHEEZING: 0
NAUSEA: 1
COUGH: 0
SHORTNESS OF BREATH: 0
CHOKING: 0
CHEST TIGHTNESS: 1
VOMITING: 1
BLOOD IN STOOL: 0

## 2020-11-24 NOTE — PROGRESS NOTES
2020    TELEHEALTH EVALUATION -- Audio/Visual (During LROHD-91 public health emergency)    HPI:  Visit conducted with pt at home and provider Umu Starks CNP in office. Fifi Mejia (:  1975) has requested an audio/video evaluation for the following concern(s):    Pt presents today from an ER visit. Pt had been having RUQ pain and nausea with some vomiting for the last 3 weeks. went to ER and had an u/s of gallbladder and it identified some wall thickening, no gallstones, normal CMD size. She also had normal labs. She has had these symptoms in the past with a normal u/ of gallbladder. States the RUQ pain is worse after eating but can happen randomly also. States he has lost 30 pounds this year but has been trying. She has cut out gluten and dairy. Jatinder acid reflux, denies burning in her chest denies coughing. Does not take any acid reflux meds. Has also started having some chest pressure in the center fh her chest. This started recently, she is very stressed with work, has a lot of responsibility. Does take  zoloft but does not think it Is helping. Will wake up sometimes in the middle of the night. With chest pressure. Denies sweating or left arm numbness or tingling with it. Had a normal stress test in 2019. She had a normal EGD in ER. States it is hard to get out of bed in the morning, has teresa late to work, does not look forward to anything, feels down and sad a lot, tearful a lot. Jatinder thoughts of huritng herself or others. Hard for her to focus and concentrate. Serena  Has had concerns with this over the years and has tired several medicaitons that have not worked for her. she has tried and failed effexor 37.5 mg daily, zoloft 100 mg daily, wellbutrin 150 mg XL daily and straterra 18 mg was too expensive. Review of Systems   Constitutional: Negative for chills, fatigue and fever. HENT: Negative. Respiratory: Positive for chest tightness.  Negative for cough, choking, shortness of breath and wheezing. Cardiovascular: Negative. Gastrointestinal: Positive for abdominal pain, nausea and vomiting. Negative for abdominal distention, blood in stool and constipation. Genitourinary: Negative for difficulty urinating and dysuria. Musculoskeletal: Negative. Skin: Negative. Neurological: Negative for dizziness, facial asymmetry, speech difficulty, weakness, light-headedness, numbness and headaches. Psychiatric/Behavioral: Positive for decreased concentration and dysphoric mood. Negative for agitation, behavioral problems, confusion, self-injury, sleep disturbance and suicidal ideas. The patient is nervous/anxious. Prior to Visit Medications    Medication Sig Taking? Authorizing Provider   PARoxetine (PAXIL) 20 MG tablet Take 1 tablet by mouth daily Yes ANGELA Osorio CNP   vitamin D (ERGOCALCIFEROL) 1.25 MG (53033 UT) CAPS capsule Take 1 capsule by mouth once a week  ANGELA Curiel CNP   oxybutynin (DITROPAN XL) 5 MG extended release tablet Take 1 tablet by mouth daily  ANGELA Curiel CNP   topiramate (TOPAMAX) 50 MG tablet Take 1 tablet by mouth 2 times daily  ANGELA Curiel CNP   levothyroxine (SYNTHROID) 100 MCG tablet Take 1 tablet by mouth daily  ANGELA Osorio CNP   SUMAtriptan (IMITREX) 25 MG tablet May take 1 tablet at the onset of a headache may repeat X1 dose,  2 hours later if needed. Not to exceed 2 tablets in 24 hours.   ANGELA Osorio CNP   RaNITidine HCl (ZANTAC PO) Take by mouth daily as needed   Historical Provider, MD   dicyclomine (BENTYL) 10 MG capsule Take 1 capsule by mouth 4 times daily  ANGELA Curiel CNP   loratadine (CLARITIN) 10 MG tablet Take 1 tablet by mouth daily  ANGELA Curiel CNP   Acetaminophen (TYLENOL EXTRA STRENGTH PO) Take 1,500 mg by mouth every 6 hours as needed   Historical Provider, MD   Ibuprofen-Diphenhydramine Cit (ADVIL PM PO) Take 2 tablets by mouth as needed Historical Provider, MD       Social History     Tobacco Use    Smoking status: Current Some Day Smoker     Packs/day: 0.50     Years: 18.00     Pack years: 9.00     Types: Cigarettes     Start date: 1988     Last attempt to quit: 2018     Years since quittin.9    Smokeless tobacco: Never Used   Substance Use Topics    Alcohol use: Not Currently     Alcohol/week: 0.0 standard drinks     Comment: Socially    Drug use: No        No Known Allergies,   Past Medical History:   Diagnosis Date    Depression     Hyperlipidemia     Migraines    ,   Past Surgical History:   Procedure Laterality Date    OTHER SURGICAL HISTORY  11-10-14    diagnostic laparoscopy, lysis of adhesions, cautery of endometriosis    [place]  DELIVERY ONLY          TUBAL LIGATION     ,   Social History     Tobacco Use    Smoking status: Current Some Day Smoker     Packs/day: 0.50     Years: 18.00     Pack years: 9.00     Types: Cigarettes     Start date: 1988     Last attempt to quit: 2018     Years since quittin.9    Smokeless tobacco: Never Used   Substance Use Topics    Alcohol use: Not Currently     Alcohol/week: 0.0 standard drinks     Comment: Socially    Drug use: No   ,   Family History   Problem Relation Age of Onset    Heart Disease Mother     High Blood Pressure Mother     Other Mother         Aneurysm    Other Maternal Grandmother         Passed away from Heart Attack    Other Maternal Grandfather         Alcoholism    Diabetes Paternal Grandmother     Other Paternal Grandmother         Kidney failure due to diabetes    Heart Disease Brother    ,   Immunization History   Administered Date(s) Administered    Influenza, Quadv, IM, PF (6 mo and older Fluzone, Flulaval, Fluarix, and 3 yrs and older Afluria) 2019    Tdap (Boostrix, Adacel) 2016   ,   Health Maintenance   Topic Date Due    Pneumococcal 0-64 years Vaccine (1 of  - PPSV23) 1980    Cervical cancer screen  11/30/2018    Flu vaccine (1) 09/01/2020    Lipid screen  01/11/2024    DTaP/Tdap/Td vaccine (2 - Td) 08/30/2026    HIV screen  Addressed    Hepatitis A vaccine  Aged Out    Hepatitis B vaccine  Aged Out    Hib vaccine  Aged Out    Meningococcal (ACWY) vaccine  Aged Out       PHYSICAL EXAMINATION:  [ INSTRUCTIONS:  \"[x]\" Indicates a positive item  \"[]\" Indicates a negative item  -- DELETE ALL ITEMS NOT EXAMINED]  Vital Signs: (As obtained by patient/caregiver or practitioner observation)    Blood pressure-  Heart rate-    Respiratory rate-    Temperature-  Pulse oximetry-     Constitutional: [x] Appears well-developed and well-nourished [x] No apparent distress      [] Abnormal-   Mental status  [x] Alert and awake  [x] Oriented to person/place/time []Able to follow commands      Eyes:  EOM    []  Normal  [] Abnormal-  Sclera  [x]  Normal  [] Abnormal -         Discharge [x]  None visible  [] Abnormal -    HENT:   [] Normocephalic, atraumatic.   [] Abnormal   [x] Mouth/Throat: Mucous membranes are moist.     External Ears [] Normal  [] Abnormal-     Neck: [] No visualized mass     Pulmonary/Chest: [x] Respiratory effort normal.  [x] No visualized signs of difficulty breathing or respiratory distress        [] Abnormal-      Musculoskeletal:   [] Normal gait with no signs of ataxia         [] Normal range of motion of neck        [] Abnormal-       Neurological:        [] No Facial Asymmetry (Cranial nerve 7 motor function) (limited exam to video visit)          [] No gaze palsy        [] Abnormal-         Skin:        [x] No significant exanthematous lesions or discoloration noted on facial skin         [] Abnormal-            Psychiatric:       [] Normal Affect [x] No Hallucinations        [x] Abnormal- pt very tearful during visit     Other pertinent observable physical exam findings-     ASSESSMENT/PLAN:  1. RUQ pain  Gallbladder disease vs h pylori  If Hida normal will order stool for H pylori, if negative will refer to GI   - [place] [facility] [address]; Future    2. Nausea  #1  - [place] [facility] SCAN W EJECTION FRACTION; Future    3. Anxiety  Stop zoloft change over to paxil and  monitor  - [place] OFFICE OUTPATIENT VISIT 25 MINUTES    4. Moderate episode of recurrent major depressive disorder (HCC)  Stop zoloft change over to paxil   Extensive counseling given  Pt also requesting a letter she can not wear a face mask as it makes her too anxious and she can not breathe with it. Letter written will send to her. - [place] OFFICE OUTPATIENT VISIT 25 MINUTES  Pt to contact office in 3 weeks with update on new med  Pt in agreement and voiced understanding. No follow-ups on file. Evy Fontaine is a 54 y.o. female being evaluated by a Virtual Visit (video visit) encounter to address concerns as mentioned above. A caregiver was present when appropriate. Due to this being a TeleHealth encounter ([facility]), evaluation of the following organ systems was limited: Vitals/Constitutional/EENT/Resp/CV/GI//MS/Neuro/Skin/Heme-Lymph-Imm. Pursuant to the emergency declaration under the [facility], [address] [place] [address] authority and the [employer] and [employer] Act, this Virtual Visit was conducted with patient's (and/or legal guardian's) consent, to reduce the patient's risk of exposure to COVID-19 and provide necessary medical care. The patient (and/or legal guardian) has also been advised to contact this office for worsening conditions or problems, and seek emergency medical treatment and/or call 911 if deemed necessary. Patient identification was verified at the start of the visit: Yes    Total time spent on this encounter: over gil fof visit was spent in counseling, OVer 20 minutes. Services were provided through a video synchronous discussion virtually to substitute for in-person clinic visit. Patient and provider were located at their individual homes. --ANGELA Banks - CNP on 11/24/2020 at 10:36 AM    An electronic signature was used to authenticate this note.

## 2020-11-24 NOTE — LETTER
[address]  [address] [place] [address]. [facility] [address]  Phone: [phone]  Fax: [phone]    ANGELA Ley CNP        November 24, 2020     Patient: Birdie King   YOB: 1975   Date of Visit: 11/24/2020       To Whom It May Concern: It is my medical opinion that Ron Wood is not able to ear a face mask due to a medical condition. Patietn would be able to tolerate a face shield. .    If you have any questions or concerns, please don't hesitate to call.     Sincerely,        ANGELA Ley CNP

## 2020-12-07 ENCOUNTER — HOSPITAL ENCOUNTER (OUTPATIENT)
Dept: NUCLEAR MEDICINE | Age: 45
Discharge: HOME | End: 2020-12-07
Payer: COMMERCIAL

## 2020-12-07 ENCOUNTER — TELEPHONE (OUTPATIENT)
Dept: FAMILY MEDICINE CLINIC | Age: 45
End: 2020-12-07

## 2020-12-07 VITALS — BODY MASS INDEX: 24.99 KG/M2 | WEIGHT: 150 LBS | HEIGHT: 65 IN

## 2020-12-07 PROCEDURE — 2580000003 HC RX 258: Performed by: NURSE PRACTITIONER

## 2020-12-07 PROCEDURE — A9537 TC99M MEBROFENIN: HCPCS | Performed by: NURSE PRACTITIONER

## 2020-12-07 PROCEDURE — 3430000000 HC RX DIAGNOSTIC RADIOPHARMACEUTICAL: Performed by: NURSE PRACTITIONER

## 2020-12-07 PROCEDURE — 6360000002 HC RX W HCPCS: Performed by: NURSE PRACTITIONER

## 2020-12-07 PROCEDURE — 78227 HEPATOBIL SYST IMAGE W/DRUG: CPT

## 2020-12-07 RX ADMIN — Medication 8.5 MILLICURIE: at 09:15

## 2020-12-07 RX ADMIN — SODIUM CHLORIDE 1.36 MCG: 9 INJECTION, SOLUTION INTRAVENOUS at 10:18

## 2020-12-08 ENCOUNTER — TELEPHONE (OUTPATIENT)
Dept: FAMILY MEDICINE CLINIC | Age: 45
End: 2020-12-08

## 2020-12-08 NOTE — TELEPHONE ENCOUNTER
----- Message from ANGELA Humphreys CNP sent at 12/7/2020  4:55 PM EST -----  Let pt know her HIDA scan was normal, this means her gallbladder is functioning appropriately to release juices to break down her foods. I would like to order a stool test for h pylori as we discussed ather appt to see if she has this bacteria as this can cause vomiting with eating, If this test is negative will refer to GI. She can pick the stool container up from here, will place it at .

## 2020-12-08 NOTE — TELEPHONE ENCOUNTER
Pt notified of the instructions on what to do and where to take it when specimen is collected. Order and containers up front. Pt will  sometime this week.

## 2021-09-04 ENCOUNTER — HOSPITAL ENCOUNTER (EMERGENCY)
Age: 46
Discharge: HOME | End: 2021-09-04
Attending: EMERGENCY MEDICINE
Payer: COMMERCIAL

## 2021-09-04 VITALS
HEART RATE: 62 BPM | OXYGEN SATURATION: 99 % | HEIGHT: 65 IN | RESPIRATION RATE: 16 BRPM | WEIGHT: 156 LBS | BODY MASS INDEX: 25.99 KG/M2 | SYSTOLIC BLOOD PRESSURE: 130 MMHG | TEMPERATURE: 98.6 F | DIASTOLIC BLOOD PRESSURE: 91 MMHG
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
## 2021-09-04 PROCEDURE — 96372 THER/PROPH/DIAG INJ SC/IM: CPT

## 2021-09-04 PROCEDURE — 6360000002 HC RX W HCPCS: Performed by: EMERGENCY MEDICINE

## 2021-09-04 PROCEDURE — 90471 IMMUNIZATION ADMIN: CPT | Performed by: EMERGENCY MEDICINE

## 2021-09-04 PROCEDURE — 90715 TDAP VACCINE 7 YRS/> IM: CPT | Performed by: EMERGENCY MEDICINE

## 2021-09-04 PROCEDURE — 6370000000 HC RX 637 (ALT 250 FOR IP)

## 2021-09-04 PROCEDURE — 99283 EMERGENCY DEPT VISIT LOW MDM: CPT

## 2021-09-04 PROCEDURE — 6370000000 HC RX 637 (ALT 250 FOR IP): Performed by: EMERGENCY MEDICINE

## 2021-09-04 RX ORDER — GINSENG 100 MG
CAPSULE ORAL
Status: COMPLETED
Start: 2021-09-04 | End: 2021-09-04

## 2021-09-04 RX ORDER — AMOXICILLIN AND CLAVULANATE POTASSIUM 875; 125 MG/1; MG/1
1 TABLET, FILM COATED ORAL ONCE
Status: COMPLETED | OUTPATIENT
Start: 2021-09-04 | End: 2021-09-04

## 2021-09-04 RX ORDER — AMOXICILLIN AND CLAVULANATE POTASSIUM 875; 125 MG/1; MG/1
1 TABLET, FILM COATED ORAL 2 TIMES DAILY
Qty: 20 TABLET | Refills: 0 | Status: SHIPPED | OUTPATIENT
Start: 2021-09-04 | End: 2021-09-14

## 2021-09-04 RX ADMIN — TETANUS TOXOID, REDUCED DIPHTHERIA TOXOID AND ACELLULAR PERTUSSIS VACCINE, ADSORBED 0.5 ML: 5; 2.5; 8; 8; 2.5 SUSPENSION INTRAMUSCULAR at 18:34

## 2021-09-04 RX ADMIN — BACITRACIN: 500 OINTMENT TOPICAL at 18:34

## 2021-09-04 RX ADMIN — AMOXICILLIN AND CLAVULANATE POTASSIUM 1 TABLET: 875; 125 TABLET, FILM COATED ORAL at 18:34

## 2021-09-04 ASSESSMENT — PAIN DESCRIPTION - PAIN TYPE: TYPE: ACUTE PAIN

## 2021-09-04 ASSESSMENT — PAIN DESCRIPTION - ORIENTATION: ORIENTATION: LEFT

## 2021-09-04 ASSESSMENT — PAIN SCALES - GENERAL: PAINLEVEL_OUTOF10: 3

## 2021-09-04 ASSESSMENT — PAIN DESCRIPTION - LOCATION: LOCATION: JAW

## 2021-09-04 NOTE — ED NOTES
Wound care provided to left side of jaw. Minimal drainage noted. Bacitracin applied, followed by 4x4. Pt tolerated well. Pt medicated per order.       Leona Opitz, RN  09/04/21 0109

## 2021-09-04 NOTE — ED PROVIDER NOTES
SUMATRIPTAN (IMITREX) 25 MG TABLET    May take 1 tablet at the onset of a headache may repeat X1 dose,  2 hours later if needed. Not to exceed 2 tablets in 24 hours. TOPIRAMATE (TOPAMAX) 50 MG TABLET    Take 1 tablet by mouth 2 times daily    VITAMIN D (ERGOCALCIFEROL) 1.25 MG (33544 UT) CAPS CAPSULE    Take 1 capsule by mouth once a week       ALLERGIES     Patient has no known allergies. FAMILY HISTORY       Family History   Problem Relation Age of Onset    Heart Disease Mother     High Blood Pressure Mother     Other Mother         Aneurysm    Other Maternal Grandmother         Passed away from Heart Attack    Other Maternal Grandfather         Alcoholism    Diabetes Paternal Grandmother     Other Paternal Grandmother         Kidney failure due to diabetes    Heart Disease Brother         SOCIAL HISTORY       Social History     Socioeconomic History    Marital status:      Spouse name: Not on file    Number of children: Not on file    Years of education: Not on file    Highest education level: Not on file   Occupational History    Not on file   Tobacco Use    Smoking status: Current Some Day Smoker     Packs/day: 0.50     Years: 18.00     Pack years: 9.00     Types: Cigarettes     Start date: 1988     Last attempt to quit: 2018     Years since quittin.7    Smokeless tobacco: Never Used   Substance and Sexual Activity    Alcohol use: Not Currently     Alcohol/week: 0.0 standard drinks     Comment: Socially    Drug use: No    Sexual activity: Yes     Partners: Male   Other Topics Concern    Not on file   Social History Narrative    Not on file     Social Determinants of Health     Financial Resource Strain:     Difficulty of Paying Living Expenses:    Food Insecurity:     Worried About Running Out of Food in the Last Year:     Ran Out of Food in the Last Year:    Transportation Needs:     Lack of Transportation (Medical):      Lack of Transportation (Non-Medical): Physical Activity:     Days of Exercise per Week:     Minutes of Exercise per Session:    Stress:     Feeling of Stress :    Social Connections:     Frequency of Communication with Friends and Family:     Frequency of Social Gatherings with Friends and Family:     Attends Church Services:     Active Member of Clubs or Organizations:     Attends Club or Organization Meetings:     Marital Status:    Intimate Partner Violence:     Fear of Current or Ex-Partner:     Emotionally Abused:     Physically Abused:     Sexually Abused:        REVIEW OF SYSTEMS     Review of Systems   Constitutional: Negative for chills and fever. Skin: Positive for wound. Except as noted above the remainder of the review of systems was reviewed and is. PHYSICAL EXAM    (up to 7 for level 4, 8 or more for level 5)     ED Triage Vitals [09/04/21 1719]   BP Temp Temp Source Pulse Resp SpO2 Height Weight   (!) 130/91 98.6 °F (37 °C) Oral 62 16 99 % 5' 5\" (1.651 m) 156 lb (70.8 kg)       Physical Exam  Vitals and nursing note reviewed. Constitutional:       Appearance: She is well-developed. HENT:      Head: Normocephalic. Eyes:      Conjunctiva/sclera: Conjunctivae normal.      Pupils: Pupils are equal, round, and reactive to light. Neck:      Trachea: No tracheal deviation. Pulmonary:      Effort: Pulmonary effort is normal.   Musculoskeletal:         General: Normal range of motion. Cervical back: Neck supple. Skin:     General: Skin is warm and dry. Comments: There are several superficial abrasions to the left mandibular line at the left mid mandible. The largest of them is approximately 7 mm in length, no active and actively bleeding, not significantly deep, not gaping. It is well approximated. Neurological:      Mental Status: She is alert and oriented to person, place, and time. Cranial Nerves: No cranial nerve deficit.          DIAGNOSTIC RESULTS     EKG:(none if blank)  All EKG's are [facility] who either signs or Co-signs this chart in the absence of a cardiologist.    RADIOLOGY: (none if blank)   Interpretation per the Radiologistbelow, if available at the time of this note:    No orders to display       LABS:  Labs Reviewed - No data to display    All other labs were within normal range or not returned as of this dictation. Please note, any cultures that may have been sent were not resulted at the time of this patient visit. EMERGENCY DEPARTMENT COURSE andMedical Decision Making:     MDM/   The wound copiously irrigated with saline. Discussed risk of closure in terms of infection versus leaving wound to open to heal by secondary intent, which is probably the more prudent course given possibility of significant infection due to dog bite. The patient was very comfortable with this. She is being started on Augmentin  Wound care instructions and precautions discussed with her. Tetanus updated. Strict returnprecautions and follow up instructions were discussed with the patient with which the patient agrees        ED Medications administered this visit:    Medications   Tetanus-Diphth-Acell Pertussis (BOOSTRIX) injection 0.5 mL (has no administration in time range)         Procedures: (None if blank)       CLINICAL       1.  Dog bite, initial encounter          DISPOSITION/PLAN   DISPOSITION Discharge - Pending Orders Complete 09/04/2021 06:21:30 PM      PATIENT REFERRED TO:  ANGELA Hernandez - FABIOL[address]  [phone]    In 2 days        DISCHARGE MEDICATIONS:  New Prescriptions    AMOXICILLIN-CLAVULANATE (AUGMENTIN) 875-125 MG PER TABLET    Take 1 tablet by mouth 2 times daily for 10 days              (Please note that portions of this note were completed with a voice recognition program.  Efforts were made to edit the dictations but occasionallywords are mis-transcribed.)      [address], DO,FACEP (electronically signed)  Attending Physician, Emergency [address], DO  09/04/21 7749

## 2021-11-30 DIAGNOSIS — N32.81 OVERACTIVE BLADDER: ICD-10-CM

## 2021-11-30 RX ORDER — OXYBUTYNIN CHLORIDE 5 MG/1
5 TABLET, EXTENDED RELEASE ORAL DAILY
Qty: 90 TABLET | Refills: 3 | Status: SHIPPED | OUTPATIENT
Start: 2021-11-30 | End: 2022-04-27 | Stop reason: SDUPTHER

## 2021-11-30 RX ORDER — ERGOCALCIFEROL 1.25 MG/1
50000 CAPSULE ORAL WEEKLY
Qty: 12 CAPSULE | Refills: 3 | Status: SHIPPED | OUTPATIENT
Start: 2021-11-30 | End: 2022-05-02 | Stop reason: DRUGHIGH

## 2022-03-03 ENCOUNTER — TELEPHONE (OUTPATIENT)
Dept: FAMILY MEDICINE CLINIC | Age: 47
End: 2022-03-03

## 2022-03-03 DIAGNOSIS — Z20.822 ENCOUNTER FOR SCREENING LABORATORY TESTING FOR COVID-19 VIRUS IN ASYMPTOMATIC PATIENT: Primary | ICD-10-CM

## 2022-03-07 ENCOUNTER — HOSPITAL ENCOUNTER (OUTPATIENT)
Age: 47
Discharge: HOME | End: 2022-03-07
Payer: COMMERCIAL

## 2022-03-07 DIAGNOSIS — Z20.822 ENCOUNTER FOR SCREENING LABORATORY TESTING FOR COVID-19 VIRUS IN ASYMPTOMATIC PATIENT: ICD-10-CM

## 2022-03-07 LAB
INFLUENZA A: NOT DETECTED
INFLUENZA B: NOT DETECTED
SARS-COV-2 RNA, RT PCR: NOT DETECTED

## 2022-03-07 PROCEDURE — 87636 SARSCOV2 & INF A&B AMP PRB: CPT

## 2022-03-08 ENCOUNTER — TELEPHONE (OUTPATIENT)
Dept: FAMILY MEDICINE CLINIC | Age: 47
End: 2022-03-08

## 2022-03-08 ENCOUNTER — PATIENT MESSAGE (OUTPATIENT)
Dept: FAMILY MEDICINE CLINIC | Age: 47
End: 2022-03-08

## 2022-03-08 NOTE — TELEPHONE ENCOUNTER
----- Message from ANGELA Chaves CNP sent at 3/8/2022 11:19 AM EST -----  Let pt know her covid test was negative

## 2022-03-08 NOTE — TELEPHONE ENCOUNTER
From: Matt Oliveira  To: Berhane Singh  Sent: 3/8/2022 7:46 AM EST  Subject: Question regarding COVID-19     Good Morning Kei,  I need to upload my Covid test results to a web link is there a form or anything I can receive in order to upload for my travel?    Thank you

## 2022-04-27 ENCOUNTER — OFFICE VISIT (OUTPATIENT)
Dept: FAMILY MEDICINE CLINIC | Age: 47
End: 2022-04-27
Payer: COMMERCIAL

## 2022-04-27 VITALS
HEIGHT: 65 IN | WEIGHT: 159.6 LBS | OXYGEN SATURATION: 98 % | RESPIRATION RATE: 14 BRPM | SYSTOLIC BLOOD PRESSURE: 128 MMHG | DIASTOLIC BLOOD PRESSURE: 76 MMHG | BODY MASS INDEX: 26.59 KG/M2 | TEMPERATURE: 98 F | HEART RATE: 68 BPM

## 2022-04-27 DIAGNOSIS — R53.83 FATIGUE, UNSPECIFIED TYPE: ICD-10-CM

## 2022-04-27 DIAGNOSIS — E55.9 HYPOVITAMINOSIS D: ICD-10-CM

## 2022-04-27 DIAGNOSIS — Z13.31 DEPRESSION SCREENING NEGATIVE: ICD-10-CM

## 2022-04-27 DIAGNOSIS — E06.3 HASHIMOTO'S THYROIDITIS: ICD-10-CM

## 2022-04-27 DIAGNOSIS — Z11.59 ENCOUNTER FOR HEPATITIS C SCREENING TEST FOR LOW RISK PATIENT: ICD-10-CM

## 2022-04-27 DIAGNOSIS — N32.81 OVERACTIVE BLADDER: ICD-10-CM

## 2022-04-27 DIAGNOSIS — R11.0 NAUSEA: Primary | ICD-10-CM

## 2022-04-27 DIAGNOSIS — G43.809 OTHER MIGRAINE WITHOUT STATUS MIGRAINOSUS, NOT INTRACTABLE: ICD-10-CM

## 2022-04-27 DIAGNOSIS — K29.00 ACUTE GASTRITIS WITHOUT HEMORRHAGE, UNSPECIFIED GASTRITIS TYPE: ICD-10-CM

## 2022-04-27 PROCEDURE — 99214 OFFICE O/P EST MOD 30 MIN: CPT | Performed by: NURSE PRACTITIONER

## 2022-04-27 RX ORDER — ONDANSETRON 4 MG/1
4 TABLET, FILM COATED ORAL 3 TIMES DAILY PRN
Qty: 30 TABLET | Refills: 0 | Status: SHIPPED | OUTPATIENT
Start: 2022-04-27 | End: 2022-09-13 | Stop reason: SDUPTHER

## 2022-04-27 RX ORDER — OXYBUTYNIN CHLORIDE 10 MG/1
10 TABLET, EXTENDED RELEASE ORAL DAILY
Qty: 90 TABLET | Refills: 3 | Status: SHIPPED | OUTPATIENT
Start: 2022-04-27 | End: 2022-09-01 | Stop reason: SDUPTHER

## 2022-04-27 RX ORDER — OMEPRAZOLE 20 MG/1
20 CAPSULE, DELAYED RELEASE ORAL DAILY
Qty: 90 CAPSULE | Refills: 1 | Status: SHIPPED | OUTPATIENT
Start: 2022-04-27 | End: 2022-06-01 | Stop reason: SDUPTHER

## 2022-04-27 RX ORDER — SUCRALFATE 1 G/1
1 TABLET ORAL 4 TIMES DAILY
Qty: 120 TABLET | Refills: 3 | Status: SHIPPED | OUTPATIENT
Start: 2022-04-27

## 2022-04-27 RX ORDER — TOPIRAMATE 50 MG/1
50 TABLET, FILM COATED ORAL 2 TIMES DAILY
Qty: 180 TABLET | Refills: 3 | Status: SHIPPED | OUTPATIENT
Start: 2022-04-27

## 2022-04-27 ASSESSMENT — ENCOUNTER SYMPTOMS
BLOOD IN STOOL: 0
RECTAL PAIN: 0
SORE THROAT: 0
RHINORRHEA: 0
BACK PAIN: 0
NAUSEA: 1
SINUS PAIN: 0
ABDOMINAL DISTENTION: 0
RESPIRATORY NEGATIVE: 1
ABDOMINAL PAIN: 1
CONSTIPATION: 0
DIARRHEA: 1
VOMITING: 0
SINUS PRESSURE: 0

## 2022-04-27 ASSESSMENT — PATIENT HEALTH QUESTIONNAIRE - PHQ9
2. FEELING DOWN, DEPRESSED OR HOPELESS: 0
SUM OF ALL RESPONSES TO PHQ QUESTIONS 1-9: 0
SUM OF ALL RESPONSES TO PHQ9 QUESTIONS 1 & 2: 0
SUM OF ALL RESPONSES TO PHQ QUESTIONS 1-9: 0
1. LITTLE INTEREST OR PLEASURE IN DOING THINGS: 0

## 2022-04-27 NOTE — PROGRESS NOTES
[facility] MEDICINE  [address] [place] [address]  Dept: [phone]  Dept Fax: [phone]  Loc: Keith Timmons is a 51 y.o. femalewho presents today for her medical conditions/complaints as noted below. Fifi Mejiais c/o of Follow-up (nausea x1 month), Other (requesting blood work. vit d), and Diarrhea (new in the last month as well, some vomiting)      HPI:      Pt here to discuss a few concerns. Pt ha bene taking oxybutynin 5 mg for overactive bladder and it was working at first but now symptoms have exacerbated. she is doing keegels and is having to wear a sanitary pad for bladder leakage. Will increase dose to 10 mg daily. She has been having daily nausea for the last month, it happens all day long, is better with eating but when belly gets empty the nausea returns. Denies dry heaving or vomiting does have some diarrhea associated with it off and on. Has not lost weight, has not tried any meds for it., has never Gi in the past and never had a colonoscopy. Denies black tarry stools or blood in stools. Has been feeling fatigued lately, sleeping a lot, denies snoring. Does see endocrinology being treated for hashimotos thyroiditis. Lab Results       Component                Value               Date                       TSH                      0.946               06/15/2020                 FT3                      2.60                06/15/2020                 T4FREE                   1.33                06/15/2020        TSH 1.629 from [facility] in chart. Stable. States she did loose about 10 pounds when her thyroid level stabilized.      Lab Results       Component                Value               Date                       WBC                      9.1                 11/22/2020                 HGB                      14.7                11/22/2020                 HCT                      44.1                11/22/2020 MCV                      93.0                11/22/2020                 PLT                      314                 11/22/2020           Will update labs. Has had concerns with RUQ pain and had a normal u/s of gallbladder in 2020 along with normal HIDA scan results reviewed. Narrative  PROCEDURE: US GALLBLADDER RUQ     CLINICAL INFORMATION: RUQ pain, occuring mostly after meals.     COMPARISON: No prior study.     TECHNIQUE: Multiplanar sonographic images were obtained of the structures in the right upper quadrant.     FINDINGS:     Liver - L= 13 cm  Gallbladder - 10.42 x 2.68 x 3.40 cm  Gallbladder Wall - 0.58 cm  Common Duct - 0.36 cm  Pierre's Sign: Positive        The liver is of normal echogenicity. Small hyperechoic focus in the left lobe of the liver measuring 10 mm in size which could represents small hemangioma. .  The pancreatic head and body are within normal limits. The tail is not well seen due to   overlying bowel gas.     The gallbladder is not distended. There are no gallstones. There is slight gallbladder wall thickening. There is no pericholecystic fluid.  . The common bile duct is normal and measures 3.6mm       There is no hydronephrosis in the visualized aspects of the right kidney.        Impression     1. There are no gallstones. There is some gallbladder wall thickening and a possible positive Pierre's sign. 2. Small hemangioma in the left lobe of the liver. 3. Otherwise negative gallbladder ultrasound. [facility]  NUCLEAR MEDICINE HEPATOBILIARY SCAN WITH CCK     CLINICAL INFORMATION: Right-sided pain, nausea, dry heaving     COMPARISON: 11/7/2014     TECHNIQUE:  The patient was injected with 8.5 mCi of technetium 99m mebrofenin.     FINDINGS:  Gamma camera images were obtained over the abdomen and demonstrated rapid uptake of the radionuclide by the hepatocytes.  The gallbladder begins to visualize by 16 minutes.    The patient was administered 1.4 mcg Kinevac and using region of   interest, the gallbladder ejection fraction was calculated.  The gallbladder ejection fraction is 78%.       Impression  Normal nuclear medicine hepatobiliary scan with a gallbladder ejection fraction of 78%.       Pt with history of low vitamin d taking 50 000 unit weekly will repeat levels. Has migraine h/a controlled with topamax. Current Outpatient Medications   Medication Sig Dispense Refill    oxybutynin (DITROPAN-XL) 10 MG extended release tablet Take 1 tablet by mouth daily 90 tablet 3    topiramate (TOPAMAX) 50 MG tablet Take 1 tablet by mouth 2 times daily 180 tablet 3    ondansetron (ZOFRAN) 4 MG tablet Take 1 tablet by mouth 3 times daily as needed for Nausea or Vomiting 30 tablet 0    omeprazole (PRILOSEC) 20 MG delayed release capsule Take 1 capsule by mouth Daily 90 capsule 1    sucralfate (CARAFATE) 1 GM tablet Take 1 tablet by mouth 4 times daily 120 tablet 3    vitamin D (ERGOCALCIFEROL) 1.25 MG (36885 UT) CAPS capsule Take 1 capsule by mouth once a week 12 capsule 3    PARoxetine (PAXIL) 20 MG tablet Take 1 tablet by mouth daily 90 tablet 3    levothyroxine (SYNTHROID) 100 MCG tablet Take 1 tablet by mouth daily 90 tablet 1    SUMAtriptan (IMITREX) 25 MG tablet May take 1 tablet at the onset of a headache may repeat X1 dose,  2 hours later if needed. Not to exceed 2 tablets in 24 hours. 9 tablet 2    RaNITidine HCl (ZANTAC PO) Take by mouth daily as needed       loratadine (CLARITIN) 10 MG tablet Take 1 tablet by mouth daily 30 tablet 3    Acetaminophen (TYLENOL EXTRA STRENGTH PO) Take 1,500 mg by mouth every 6 hours as needed       Ibuprofen-Diphenhydramine Cit (ADVIL PM PO) Take 2 tablets by mouth as needed        No current facility-administered medications for this visit.           Past Medical History:   Diagnosis Date    Depression     Hyperlipidemia     Migraines       Past Surgical History:   Procedure Laterality Date    OTHER SURGICAL HISTORY  11-10-14    diagnostic laparoscopy, lysis of adhesions, cautery of endometriosis    VT  DELIVERY ONLY          TUBAL LIGATION       Family History   Problem Relation Age of Onset    Heart Disease Mother     High Blood Pressure Mother     Other Mother         Aneurysm    Other Maternal Grandmother         Passed away from Heart Attack    Other Maternal Grandfather         Alcoholism    Diabetes Paternal Grandmother     Other Paternal Grandmother         Kidney failure due to diabetes    Heart Disease Brother      Social History     Tobacco Use    Smoking status: Current Some Day Smoker     Packs/day: 0.50     Years: 18.00     Pack years: 9.00     Types: Cigarettes     Start date: 1988     Last attempt to quit: 2018     Years since quitting: 3.3    Smokeless tobacco: Never Used   Substance Use Topics    Alcohol use: Not Currently     Alcohol/week: 0.0 standard drinks     Comment: Socially        No Known Allergies    Health Maintenance   Topic Date Due    COVID-19 Vaccine (1) Never done    Pneumococcal 0-64 years Vaccine (1 - PCV) Never done    Depression Monitoring  Never done    Hepatitis C screen  Never done    Cervical cancer screen  Never done    Colorectal Cancer Screen  Never done    Flu vaccine (Season Ended) 2022    Lipids  2024    DTaP/Tdap/Td vaccine (3 - Td or Tdap) 2031    HIV screen  Addressed    Hepatitis A vaccine  Aged Out    Hepatitis B vaccine  Aged Out    Hib vaccine  Aged Out    Meningococcal (ACWY) vaccine  Aged Out       Subjective:      Review of Systems   Constitutional: Positive for fatigue. Negative for chills and fever. HENT: Negative for congestion, postnasal drip, rhinorrhea, sinus pressure, sinus pain, sneezing and sore throat. Respiratory: Negative. Cardiovascular: Negative. Gastrointestinal: Positive for abdominal pain (ruq quad off and on), diarrhea and nausea.  Negative for abdominal distention, blood in stool, constipation, rectal pain and vomiting. Genitourinary: Negative for difficulty urinating and dysuria. Musculoskeletal: Negative for arthralgias, back pain and myalgias. Skin: Negative. Neurological: Negative for dizziness, facial asymmetry, weakness, light-headedness, numbness and headaches. Psychiatric/Behavioral: Negative for self-injury, sleep disturbance and suicidal ideas. Objective:      /76   Pulse 68   Temp 98 °F (36.7 °C) (Oral)   Resp 14   Ht 5' 5\" (1.651 m)   Wt 159 lb 9.6 oz (72.4 kg)   SpO2 98%   BMI 26.56 kg/m²      Physical Exam  Vitals and nursing note reviewed. Constitutional:       Appearance: She is not ill-appearing. HENT:      Nose: Nose normal.      Mouth/Throat:      Mouth: Mucous membranes are moist.   Cardiovascular:      Rate and Rhythm: Normal rate and regular rhythm. Pulses: Normal pulses. Heart sounds: Normal heart sounds. No murmur heard. Pulmonary:      Effort: Pulmonary effort is normal. No respiratory distress. Breath sounds: Normal breath sounds. Abdominal:      General: Abdomen is flat. Bowel sounds are normal.      Palpations: Abdomen is soft. Tenderness: There is abdominal tenderness in the right upper quadrant. There is no right CVA tenderness, left CVA tenderness, guarding or rebound. Positive signs include Pierre's sign. Negative signs include Rovsing's sign and McBurney's sign. Hernia: There is no hernia in the left inguinal area or left femoral area. Skin:     General: Skin is warm and dry. Capillary Refill: Capillary refill takes less than 2 seconds. Neurological:      Mental Status: She is alert. Psychiatric:         Mood and Affect: Mood normal.         Behavior: Behavior normal.         Thought Content: Thought content normal.         Judgment: Judgment normal.          Assessment/Plan:           1.  Nausea  R/o gastric ulcer vs h pylori  will refer to gi when labs and questions answered. Pt voiced understanding. Reviewedhealth maintenance. Instructed to continue current medications, diet and exercise. Patient agreed with treatment plan. Follow up as directed.        Electronicallysigned by ANGELA Matson CNP on 4/27/2022 at 4:37 PM

## 2022-04-29 LAB
ABSOLUTE BASO #: 0.1 X10E9/L (ref 0–0.2)
ABSOLUTE EOS #: 0.1 X10E9/L (ref 0–0.4)
ABSOLUTE LYMPH #: 2.1 X10E9/L (ref 1–3.5)
ABSOLUTE MONO #: 0.7 X10E9/L (ref 0–0.9)
ABSOLUTE NEUT #: 7.8 X10E9/L (ref 1.5–6.6)
ALBUMIN SERPL-MCNC: 4.2 G/DL (ref 3.2–5.3)
ALK PHOSPHATASE: 54 U/L (ref 39–130)
ALT SERPL-CCNC: 8 U/L (ref 0–31)
ANION GAP SERPL CALCULATED.3IONS-SCNC: 7 MMOL/L (ref 5–15)
AST SERPL-CCNC: 15 U/L (ref 0–41)
BASOPHILS RELATIVE PERCENT: 0.9 %
BILIRUB SERPL-MCNC: 0.5 MG/DL (ref 0.3–1.2)
BUN BLDV-MCNC: 8 MG/DL (ref 5–23)
CALCIUM SERPL-MCNC: 9 MG/DL (ref 8.5–10.5)
CHLORIDE BLD-SCNC: 105 MMOL/L (ref 98–109)
CO2: 26 MMOL/L (ref 22–32)
CREAT SERPL-MCNC: 1.06 MG/DL (ref 0.4–1)
EGFR AFRICAN AMERICAN: >60 ML/MIN/1.73SQ.M
EGFR IF NONAFRICAN AMERICAN: 56 ML/MIN/1.73SQ.M
EOSINOPHILS RELATIVE PERCENT: 1 %
FERRITIN: 11 NG/ML (ref 11–307)
FOLATE: 4.2 NG/ML
GLUCOSE: 98 MG/DL (ref 65–99)
HCT VFR BLD CALC: 42.7 % (ref 35–47)
HELICOBACTER PYLORI ANTIGEN, STOOL: NEGATIVE
HEMOGLOBIN: 14.4 G/DL (ref 11.7–15.5)
HEPATITIS C ANTIBODY: NORMAL
IRON SATURATION: 12 % (ref 20–50)
IRON, SERUM: 55 UG/DL (ref 37–145)
LYMPHOCYTE %: 19.6 %
MCH RBC QN AUTO: 28.6 PG (ref 27–34)
MCHC RBC AUTO-ENTMCNC: 33.7 G/DL (ref 32–36)
MCV RBC AUTO: 85 FL (ref 80–100)
MONOCYTES # BLD: 6.8 %
NEUTROPHILS RELATIVE PERCENT: 71.7 %
PDW BLD-RTO: 15.7 % (ref 11.5–15)
PLATELETS: 327 X10E9/L (ref 150–450)
PMV BLD AUTO: 9 FL (ref 7–12)
POTASSIUM SERPL-SCNC: 4 MMOL/L (ref 3.5–5)
RBC: 5.02 X10E12/L (ref 3.8–5.2)
SODIUM BLD-SCNC: 138 MMOL/L (ref 134–146)
TOTAL IRON BINDING CAPACITY: 474 UG/DL (ref 250–450)
TOTAL PROTEIN: 6.9 G/DL (ref 6–8)
UNSATURATED IRON BINDING CAPACITY: 419 UG/DL (ref 112–347)
VITAMIN B-12: 113 PG/ML (ref 180–914)
VITAMIN D 25-HYDROXY: 29.6 NG/ML (ref 30–100)
WBC: 10.9 X10E9/L (ref 4–11)

## 2022-05-02 ENCOUNTER — TELEPHONE (OUTPATIENT)
Dept: FAMILY MEDICINE CLINIC | Age: 47
End: 2022-05-02

## 2022-05-02 DIAGNOSIS — E61.1 IRON DEFICIENCY: ICD-10-CM

## 2022-05-02 DIAGNOSIS — E53.8 FOLATE DEFICIENCY: ICD-10-CM

## 2022-05-02 DIAGNOSIS — E55.9 VITAMIN D DEFICIENCY: Primary | ICD-10-CM

## 2022-05-02 DIAGNOSIS — R11.0 NAUSEA: ICD-10-CM

## 2022-05-02 DIAGNOSIS — E53.8 B12 DEFICIENCY: ICD-10-CM

## 2022-05-02 RX ORDER — FOLIC ACID 1 MG/1
1 TABLET ORAL DAILY
Qty: 90 TABLET | Refills: 1 | Status: SHIPPED | OUTPATIENT
Start: 2022-05-02

## 2022-05-02 RX ORDER — PERPHENAZINE/AMITRIPTYLINE HCL 2 MG-25 MG
1 TABLET ORAL DAILY
Qty: 90 TABLET | Refills: 1 | Status: SHIPPED | OUTPATIENT
Start: 2022-05-02

## 2022-05-02 RX ORDER — CHOLECALCIFEROL (VITAMIN D3) 1250 MCG
1 CAPSULE ORAL
Qty: 24 CAPSULE | Refills: 0 | Status: SHIPPED | OUTPATIENT
Start: 2022-05-02 | End: 2022-07-20

## 2022-05-02 NOTE — TELEPHONE ENCOUNTER
----- Message from Ivonne Vora, DO sent at 5/2/2022  7:02 AM EDT -----  Please let pt know that labs are a back, a few things to f/u on   -Vit remains low. Recommend inc her vit D to 50K units 3 times weekly. -Iron is mildly low, recommend start iron supplement twice daily. Also recommend we have her see GI to determine why her iron may be low, as well as to f/u on her other GI concerns she saw Kei for.   -Her J11 and folic acid levels are also low. I've ordered b12 and folate supplementation for this as well. Recommend repeat lab sin 9 wks, fasting. Would also recommend f/u with Kei in 4 wks. Please help schedule. Rest of labs look good. Let me know if questions, thanks!

## 2022-05-02 NOTE — TELEPHONE ENCOUNTER
Pt notified and agreeable. Labs mailed to home address. Referral placed at  . CAYDEN pt out of town the rest of this week , she will  supplements when she is back in town .

## 2022-06-01 ENCOUNTER — OFFICE VISIT (OUTPATIENT)
Dept: FAMILY MEDICINE CLINIC | Age: 47
End: 2022-06-01
Payer: COMMERCIAL

## 2022-06-01 VITALS
HEART RATE: 66 BPM | BODY MASS INDEX: 25.36 KG/M2 | RESPIRATION RATE: 14 BRPM | HEIGHT: 66 IN | DIASTOLIC BLOOD PRESSURE: 80 MMHG | WEIGHT: 157.8 LBS | OXYGEN SATURATION: 100 % | TEMPERATURE: 98.1 F | SYSTOLIC BLOOD PRESSURE: 130 MMHG

## 2022-06-01 DIAGNOSIS — E55.9 HYPOVITAMINOSIS D: ICD-10-CM

## 2022-06-01 DIAGNOSIS — K29.00 ACUTE GASTRITIS WITHOUT HEMORRHAGE, UNSPECIFIED GASTRITIS TYPE: Primary | ICD-10-CM

## 2022-06-01 DIAGNOSIS — R11.0 NAUSEA: ICD-10-CM

## 2022-06-01 DIAGNOSIS — Z12.31 ENCOUNTER FOR SCREENING MAMMOGRAM FOR MALIGNANT NEOPLASM OF BREAST: ICD-10-CM

## 2022-06-01 DIAGNOSIS — E53.8 FOLIC ACID DEFICIENCY: ICD-10-CM

## 2022-06-01 PROCEDURE — 99214 OFFICE O/P EST MOD 30 MIN: CPT | Performed by: NURSE PRACTITIONER

## 2022-06-01 RX ORDER — OMEPRAZOLE 20 MG/1
20 CAPSULE, DELAYED RELEASE ORAL DAILY
Qty: 90 CAPSULE | Refills: 1 | Status: SHIPPED | OUTPATIENT
Start: 2022-06-01 | End: 2022-09-13 | Stop reason: SDUPTHER

## 2022-06-01 RX ORDER — PAROXETINE HYDROCHLORIDE 20 MG/1
20 TABLET, FILM COATED ORAL DAILY
Qty: 90 TABLET | Refills: 3 | Status: SHIPPED | OUTPATIENT
Start: 2022-06-01

## 2022-06-01 ASSESSMENT — ENCOUNTER SYMPTOMS
DIARRHEA: 1
RESPIRATORY NEGATIVE: 1
NAUSEA: 1

## 2022-06-01 NOTE — PROGRESS NOTES
[address] LYUBOV Coombs [address]  Dept: [phone]  Dept Fax: [phone]  Loc: Leopold Levee is a 51 y.o. femalewho presents today for her medical conditions/complaints as noted below. Fifi Brizuela c/o of Follow-up      HPI:      Pt here to f/u on labs. Pt had been having some nausea  daily and had been referred to Gi for evaluation. It happens all day long, is better with eating but when belly gets empty the nausea returns. Denies dry heaving or vomiting does have some diarrhea associated with it off and on. Has not lost weight, has not tried any meds for it., has never Gi in the past and never had a colonoscopy. Denies black tarry stools or blood in stools.      Has been feeling fatigued lately, sleeping a lot, denies snoring. Does see endocrinology being treated for hashimotos thyroiditis. Pt had an EGD and colon. ESOPHAGOGASTRODUODENOSCOPY RECORD     RE:  Smiley Harborton DATE:  2022  CASE:  [id number]                                                                       :  1975  PHYSICIAN:  Yuni Alcazar M.D.     PROCEDURE:  Esophagogastroduodenoscopy with biopsy.     INSTRUMENTS:  Standard video upper scope.     MEDICATIONS:  5 milligrams of IV Versed and 100 micrograms of Fentanyl.     PHOTOGRAPHS:  Yes.     BIOPSIES:  Yes.        INDICATIONS:  The patient with a history of V66 deficiency, folic acid deficiency, chronic fatigue, vitamin D deficiency, epigastric discomfort, non-intractable nausea and vomiting, excess use of caffeine intake, change in bowel habits, and diarrhea. We planned today for EGD to evaluate.     ASA CLASSIFICATION:  II.     ESTIMATED BLOOD LOSS:  None.     PROCEDURE:  The patient was brought to the GI Lab. Consent was obtained. Risks involved with the procedure were explained to the patient.   Informed consent obtained.     The patient was monitored during the procedure with pulse oximetry and blood pressure monitoring. Oxygen was given by nasal cannula. Sedation was done by incremental doses of IV Versed total 5 milligrams of Versed and 100 micrograms of Fentanyl given by incremental doses given by the anesthesia service to achieve monitored anesthesia care. For ASA Classification, medication given during the procedure, please see anesthesia notes.     Standard video upper scope was advanced under direct vision from the oral cavity up to the duodenum. The esophagus showed features of grade II erosive esophagitis, biopsy obtained to evaluate. The gastroesophageal junction is at 40 cm from the incisors. The scope was advanced to the stomach. Retroflexed examination of the cardia revealed normal cardia. Antrum showed patchy erythema and mild gastritis, biopsy obtained. Duodenum showed mild duodenitis. I also elected to take a biopsy from the duodenum due to question of celiac disease as well as E53, folic acid, and iron deficiency anemia to evaluate for malabsorption. The scope was withdrawn with no immediate complications.     IMPRESSION:     1. Mild duodenitis, biopsy obtained, also due to history of multivitamin deficiency and to rule out malabsorption. 2. Gastritis. 3. Grade II esophagitis.     PLAN:     1. Patient is to be on H2 blocker or PPI. 2. Follow up on the biopsy results and return to the GI Clinic for evaluation. 3. More recommendations after colonoscopy with biopsy due to history of diarrhea and weight loss.      COLONOSCOPY RECORD     RE:  Alfonso Anton.                                                        DATE:  2022  CASE:  [id number]                                                                       :  1975  PHYSICIAN:  Marjorie Campbell M.D.     PROCEDURE:  Colonoscopy with biopsy.     INSTRUMENTS:  Standard video colonoscope.     MEDICATIONS:  Propofol total IV.     PHOTOGRAPHS:  Yes.     BIOPSIES:  Yes.        INDICATIONS:  The patient with a history of diarrhea and change in bowel habits. We planned today for colonoscopy to evaluate.     ASA CLASSIFICATION:  II.     ESTIMATED BLOOD LOSS:  None.     PROCEDURE:  The patient was brought to the GI Lab. Consent was obtained. Risks involved with the procedure were explained to the patient. Informed consent obtained.     The patient was monitored during the procedure with pulse oximetry and blood pressure monitoring. Oxygen was given by nasal cannula.     Sedation was done by incremental doses of IV propofol given by the anesthesia service to achieve monitored anesthesia care. For ASA Classification, medication given during the procedure, please see anesthesia notes. Digital examination revealed normal rectum. Standard video colonoscope was advanced under direct vision from the rectum up to the cecum. Prep was good and the patient tolerated the procedure well. Cecum intubation was confirmed by seeing the appendiceal orifice. The scope was withdrawn. No polyps, no masses, no diverticulitis, no colitis. Everything appeared to be normal.  Due to history of diarrhea, random biopsy obtained from the terminal ileum in one jar, ascending transverse in second jar, and descending sigmoid in third jar and the procedure was terminated with no immediate complications.     IMPRESSION:     1. Normal colonoscopy up to the terminal ileum. 2. Random biopsy obtained from different locations due to history of diarrhea.     PLAN:     1. Follow up on the biopsy results and return to the GI Clinic for evaluation. More recommendations after reviewing the biopsy results.     Results reviewed, pt states symptoms remain about the same, she has not started the prilosec yet. She does continue to take the folic acid and vitamin B 12 and vitamin d supplements. Has orders for repeat labs next month.      Pt is taking the oxybutynin for bladder leakage and states it is working well for her. Lab Results       Component                Value               Date                       WBC                      10.9                04/28/2022                 HGB                      14.4                04/28/2022                 HCT                      42.7                04/28/2022                 MCV                      85                  04/28/2022                 PLT                      327                 04/28/2022            Lab Results       Component                Value               Date                       NA                       138                 04/28/2022                 K                        4.0                 04/28/2022                 CL                       105                 04/28/2022                 CO2                      26                  04/28/2022                 BUN                      8                   04/28/2022                 CREATININE               1.06                04/28/2022                 GLUCOSE                  98                  04/28/2022                 CALCIUM                  9.0                 04/28/2022               Lab Results       Component                Value               Date                       IRON                     121                 01/09/2020                 TIBC                     474 (H)             04/28/2022                 FERRITIN                 11                  04/28/2022                     Current Outpatient Medications   Medication Sig Dispense Refill    PARoxetine (PAXIL) 20 MG tablet Take 1 tablet by mouth daily 90 tablet 3    omeprazole (PRILOSEC) 20 MG delayed release capsule Take 1 capsule by mouth Daily 90 capsule 1    bisacodyl (DULCOLAX) 5 MG EC tablet See Prep Instructions 4 tablet 0    polyethylene glycol (GLYCOLAX) 17 GM/SCOOP powder Dispense 238 Gram Bottle.   Use as Directed 238 g 0    Cholecalciferol (VITAMIN D3) 1.25 MG (25427 UT) CAPS Take 1 capsule by mouth three times a week for 24 doses 24 capsule 0    Cyanocobalamin (B-12) 3000 MCG SUBL Place 1 tablet under the tongue daily 90 tablet 1    folic acid (FOLVITE) 1 MG tablet Take 1 tablet by mouth daily 90 tablet 1    oxybutynin (DITROPAN-XL) 10 MG extended release tablet Take 1 tablet by mouth daily 90 tablet 3    topiramate (TOPAMAX) 50 MG tablet Take 1 tablet by mouth 2 times daily 180 tablet 3    ondansetron (ZOFRAN) 4 MG tablet Take 1 tablet by mouth 3 times daily as needed for Nausea or Vomiting 30 tablet 0    sucralfate (CARAFATE) 1 GM tablet Take 1 tablet by mouth 4 times daily 120 tablet 3    levothyroxine (SYNTHROID) 100 MCG tablet Take 1 tablet by mouth daily 90 tablet 1    SUMAtriptan (IMITREX) 25 MG tablet May take 1 tablet at the onset of a headache may repeat X1 dose,  2 hours later if needed. Not to exceed 2 tablets in 24 hours. 9 tablet 2    loratadine (CLARITIN) 10 MG tablet Take 1 tablet by mouth daily 30 tablet 3    Acetaminophen (TYLENOL EXTRA STRENGTH PO) Take 1,500 mg by mouth every 6 hours as needed        No current facility-administered medications for this visit.           Past Medical History:   Diagnosis Date    Abdominal pain     Anemia     Belching     Change in bowel habits     Chills     Constipation     Depression     Diarrhea     Flatulence     Frequent headaches     Hyperlipidemia     Migraines     Tattoos     Thyroid disease       Past Surgical History:   Procedure Laterality Date    OTHER SURGICAL HISTORY  11-10-14    diagnostic laparoscopy, lysis of adhesions, cautery of endometriosis    [place]  DELIVERY ONLY          TUBAL LIGATION       Family History   Problem Relation Age of Onset    Heart Disease Mother     High Blood Pressure Mother     Other Mother         Aneurysm    Other Maternal Grandmother         Passed away from Heart Attack    Other Maternal Grandfather         Alcoholism    Diabetes Paternal Grandmother     Other Paternal Grandmother         Kidney failure due to diabetes    Heart Disease Brother     Colon Polyps Neg Hx     Colon Cancer Neg Hx     Esophageal Cancer Neg Hx      Social History     Tobacco Use    Smoking status: Current Some Day Smoker     Packs/day: 1.50     Years: 30.00     Pack years: 45.00     Types: Cigarettes     Start date: 1/1/1988     Last attempt to quit: 12/20/2018     Years since quitting: 3.4    Smokeless tobacco: Never Used   Substance Use Topics    Alcohol use: Yes     Alcohol/week: 0.0 standard drinks     Comment: Socially        Allergies   Allergen Reactions    Soybean-Containing Drug Products        Health Maintenance   Topic Date Due    COVID-19 Vaccine (1) Never done    Pneumococcal 0-64 years Vaccine (1 - PCV) Never done    Cervical cancer screen  Never done    Flu vaccine (Season Ended) 09/01/2022    Depression Monitoring  04/27/2023    Lipids  01/11/2024    DTaP/Tdap/Td vaccine (3 - Td or Tdap) 09/04/2031    Colorectal Cancer Screen  05/26/2032    Hepatitis C screen  Completed    HIV screen  Addressed    Hepatitis A vaccine  Aged Out    Hepatitis B vaccine  Aged Out    Hib vaccine  Aged Out    Meningococcal (ACWY) vaccine  Aged Out       Subjective:      Review of Systems   Constitutional: Negative for chills, fatigue and fever. Respiratory: Negative. Cardiovascular: Negative. Gastrointestinal: Positive for diarrhea (Gi addressing) and nausea. Genitourinary: Negative for difficulty urinating and dysuria. Skin: Negative. Psychiatric/Behavioral: Negative for self-injury, sleep disturbance and suicidal ideas. Objective:      /80   Pulse 66   Temp 98.1 °F (36.7 °C) (Oral)   Resp 14   Ht 5' 6\" (1.676 m)   Wt 157 lb 12.8 oz (71.6 kg)   SpO2 100%   BMI 25.47 kg/m²      Physical Exam  Vitals and nursing note reviewed. Constitutional:       Appearance: She is not ill-appearing. HENT:      Head: Normocephalic. Pulmonary:      Effort: Pulmonary effort is normal.   Skin:     General: Skin is dry. Neurological:      General: No focal deficit present. Mental Status: She is alert. Psychiatric:         Mood and Affect: Mood normal.         Behavior: Behavior normal.         Thought Content: Thought content normal.         Judgment: Judgment normal.          Assessment/Plan:           1. Hypovitaminosis D  Continue current dose  Repeat labs next month  Keep GI appt for biopsy results as they are ruling malabsorption syndrome due to vitamin deficiencies. 2. Nausea  Start prilosec for gastritis   - omeprazole (PRILOSEC) 20 MG delayed release capsule; Take 1 capsule by mouth Daily  Dispense: 90 capsule; Refill: 1    3. Encounter for screening mammogram for malignant neoplasm of breast    - [facility] DIGITAL SCREEN W OR WO CAD BILATERAL; Future    4. Folic acid deficiency  Continue with folic acid supplement  Repeat labs next month    5. Gastritis . prilosec   Keep GI f/u     Return in about 3 months (around 9/1/2022), or if symptoms worsen or fail to improve, for PAP test.    Reccommended tobaccocessation options including pharmacologic methods, counseled great than 3 minutesduring this visit:  Yes[]  No  []       Patient given educational materials -see patient instructions. Discussed use, benefit, and side effects of prescribedmedications. All patient questions answered. Pt voiced understanding. Reviewedhealth maintenance. Instructed to continue current medications, diet and exercise. Patient agreed with treatment plan. Follow up as directed.        Electronicallysigned by ANGELA Humphreys CNP on 6/1/2022 at 8:42 AM

## 2022-07-20 DIAGNOSIS — E55.9 VITAMIN D DEFICIENCY: ICD-10-CM

## 2022-07-20 RX ORDER — CHOLECALCIFEROL (VITAMIN D3) 1250 MCG
CAPSULE ORAL
Qty: 12 CAPSULE | Refills: 1 | Status: SHIPPED | OUTPATIENT
Start: 2022-07-20 | End: 2022-09-15

## 2022-08-30 ENCOUNTER — NURSE ONLY (OUTPATIENT)
Dept: LAB | Age: 47
End: 2022-08-30

## 2022-08-30 DIAGNOSIS — E53.8 FOLATE DEFICIENCY: ICD-10-CM

## 2022-08-30 DIAGNOSIS — E55.9 VITAMIN D DEFICIENCY: ICD-10-CM

## 2022-08-30 DIAGNOSIS — E61.1 IRON DEFICIENCY: ICD-10-CM

## 2022-08-30 DIAGNOSIS — E53.8 B12 DEFICIENCY: ICD-10-CM

## 2022-08-30 LAB
BASOPHILS # BLD: 0.8 %
BASOPHILS ABSOLUTE: 0 THOU/MM3 (ref 0–0.1)
EOSINOPHIL # BLD: 2 %
EOSINOPHILS ABSOLUTE: 0.1 THOU/MM3 (ref 0–0.4)
ERYTHROCYTE [DISTWIDTH] IN BLOOD BY AUTOMATED COUNT: 13.6 % (ref 11.5–14.5)
ERYTHROCYTE [DISTWIDTH] IN BLOOD BY AUTOMATED COUNT: 42.4 FL (ref 35–45)
FERRITIN: 20 NG/ML (ref 10–291)
FOLATE: > 20 NG/ML (ref 4.8–24.2)
HCT VFR BLD CALC: 42.5 % (ref 37–47)
HEMOGLOBIN: 13.6 GM/DL (ref 12–16)
IMMATURE GRANS (ABS): 0.02 THOU/MM3 (ref 0–0.07)
IMMATURE GRANULOCYTES: 0.4 %
IRON: 57 UG/DL (ref 50–170)
LYMPHOCYTES # BLD: 33.6 %
LYMPHOCYTES ABSOLUTE: 1.7 THOU/MM3 (ref 1–4.8)
MCH RBC QN AUTO: 27.7 PG (ref 26–33)
MCHC RBC AUTO-ENTMCNC: 32 GM/DL (ref 32.2–35.5)
MCV RBC AUTO: 86.6 FL (ref 81–99)
MONOCYTES # BLD: 9.7 %
MONOCYTES ABSOLUTE: 0.5 THOU/MM3 (ref 0.4–1.3)
NUCLEATED RED BLOOD CELLS: 0 /100 WBC
PLATELET # BLD: 331 THOU/MM3 (ref 130–400)
PMV BLD AUTO: 10.4 FL (ref 9.4–12.4)
RBC # BLD: 4.91 MILL/MM3 (ref 4.2–5.4)
SEG NEUTROPHILS: 53.5 %
SEGMENTED NEUTROPHILS ABSOLUTE COUNT: 2.7 THOU/MM3 (ref 1.8–7.7)
TOTAL IRON BINDING CAPACITY: 436 UG/DL (ref 171–450)
VITAMIN B-12: 810 PG/ML (ref 211–911)
VITAMIN D 25-HYDROXY: > 120 NG/ML (ref 30–100)
WBC # BLD: 5 THOU/MM3 (ref 4.8–10.8)

## 2022-09-01 ENCOUNTER — OFFICE VISIT (OUTPATIENT)
Dept: FAMILY MEDICINE CLINIC | Age: 47
End: 2022-09-01
Payer: COMMERCIAL

## 2022-09-01 ENCOUNTER — TELEPHONE (OUTPATIENT)
Dept: FAMILY MEDICINE CLINIC | Age: 47
End: 2022-09-01

## 2022-09-01 VITALS
TEMPERATURE: 97.6 F | DIASTOLIC BLOOD PRESSURE: 72 MMHG | OXYGEN SATURATION: 99 % | HEIGHT: 65 IN | HEART RATE: 65 BPM | RESPIRATION RATE: 14 BRPM | WEIGHT: 157.4 LBS | SYSTOLIC BLOOD PRESSURE: 124 MMHG | BODY MASS INDEX: 26.23 KG/M2

## 2022-09-01 DIAGNOSIS — R53.82 CHRONIC FATIGUE: Primary | ICD-10-CM

## 2022-09-01 DIAGNOSIS — E06.3 HASHIMOTO'S THYROIDITIS: ICD-10-CM

## 2022-09-01 DIAGNOSIS — R10.11 CHRONIC RUQ PAIN: ICD-10-CM

## 2022-09-01 DIAGNOSIS — R06.83 SNORES: ICD-10-CM

## 2022-09-01 DIAGNOSIS — G89.29 CHRONIC RUQ PAIN: ICD-10-CM

## 2022-09-01 DIAGNOSIS — K29.00 ACUTE GASTRITIS WITHOUT HEMORRHAGE, UNSPECIFIED GASTRITIS TYPE: ICD-10-CM

## 2022-09-01 DIAGNOSIS — E55.9 HYPOVITAMINOSIS D: ICD-10-CM

## 2022-09-01 DIAGNOSIS — R11.0 NAUSEA: ICD-10-CM

## 2022-09-01 DIAGNOSIS — N32.81 OVERACTIVE BLADDER: ICD-10-CM

## 2022-09-01 PROCEDURE — 99214 OFFICE O/P EST MOD 30 MIN: CPT | Performed by: NURSE PRACTITIONER

## 2022-09-01 RX ORDER — OXYBUTYNIN CHLORIDE 15 MG/1
15 TABLET, EXTENDED RELEASE ORAL DAILY
Qty: 90 TABLET | Refills: 1 | Status: SHIPPED | OUTPATIENT
Start: 2022-09-01

## 2022-09-01 SDOH — ECONOMIC STABILITY: FOOD INSECURITY: WITHIN THE PAST 12 MONTHS, YOU WORRIED THAT YOUR FOOD WOULD RUN OUT BEFORE YOU GOT MONEY TO BUY MORE.: NEVER TRUE

## 2022-09-01 SDOH — ECONOMIC STABILITY: FOOD INSECURITY: WITHIN THE PAST 12 MONTHS, THE FOOD YOU BOUGHT JUST DIDN'T LAST AND YOU DIDN'T HAVE MONEY TO GET MORE.: NEVER TRUE

## 2022-09-01 ASSESSMENT — ENCOUNTER SYMPTOMS
ABDOMINAL PAIN: 1
NAUSEA: 1
RECTAL PAIN: 0
RESPIRATORY NEGATIVE: 1
DIARRHEA: 1
ABDOMINAL DISTENTION: 0
VOMITING: 0

## 2022-09-01 ASSESSMENT — PATIENT HEALTH QUESTIONNAIRE - PHQ9
2. FEELING DOWN, DEPRESSED OR HOPELESS: 0
SUM OF ALL RESPONSES TO PHQ QUESTIONS 1-9: 0
1. LITTLE INTEREST OR PLEASURE IN DOING THINGS: 0
SUM OF ALL RESPONSES TO PHQ QUESTIONS 1-9: 0
SUM OF ALL RESPONSES TO PHQ9 QUESTIONS 1 & 2: 0

## 2022-09-01 ASSESSMENT — SOCIAL DETERMINANTS OF HEALTH (SDOH): HOW HARD IS IT FOR YOU TO PAY FOR THE VERY BASICS LIKE FOOD, HOUSING, MEDICAL CARE, AND HEATING?: NOT HARD AT ALL

## 2022-09-01 NOTE — PROGRESS NOTES
Obdulia Beauchamp [address] [place] [address]  [address] LYUBOV Coombs [address]  Dept: [phone]  Dept Fax: [phone]  Loc: Nayeli Santamaria is a 49 y.o. Ganga Campaung presents today for her medical conditions/complaints as noted below. Fifi Brizuela c/o of Follow-up (3 month follow up. Nausea all the time, extreme fatigue)      HPI:      Pt here to f/u on Nausea and fatigue. Pt has been to see GI for the nausea and had a normal colonoscopy and had an EGD which did identify gastritis, she is on prilosec  for this. Had also been on carafate but GI stopped this. She dies have a f/u visit with them. Pt did have a normal u/s of gallbladder and HIDA scan in October 2020. She also admits to intermittent RUQ pain, will have nausea when she wakes up in the morning and it will last all day long. Her appetite has not changed, she does still eat and drink well. NO vomiting , she has not lost weight. Denies hematochezia or melena. Pt was diagnosed with gastritis may be having am nausea form this. If U/S gallbladder and HIDA scan normal recommend return to GI for gastri emptying study     She also continues to c/o fatigue, sleeps 8-9 hours a night and will wake up tired, will take 1/5 hour nap during the day. States she does snore, will r/o sleep apnea     She does have  hashimoto thyroiditis being managed by endocrinology recent TSH 1.6 in December 2021.      Lab Results       Component                Value               Date                       WBC                      5.0                 08/30/2022                 HGB                      13.6                08/30/2022                 HCT                      42.5                08/30/2022                 MCV                      86.6                08/30/2022                 PLT                      331                 08/30/2022               Lab Results       Component                Value               Date IRON                     57                  08/30/2022                 TIBC                     436                 08/30/2022                 FERRITIN                 20                  08/30/2022               Lab Results       Component                Value               Date/Time                  NA                       138                 04/28/2022 10:20 AM        K                        4.0                 04/28/2022 10:20 AM        CL                       105                 04/28/2022 10:20 AM        CO2                      26                  04/28/2022 10:20 AM        BUN                      8                   04/28/2022 10:20 AM        CREATININE               1.06                04/28/2022 10:20 AM        GLUCOSE                  98                  04/28/2022 10:20 AM        CALCIUM                  9.0                 04/28/2022 10:20 AM     Last vitamin D in August 2022 was elevated 120, will have her stop the extra vitamin D supplementation. Continues to take oxybutynin a t 10 mg continues to have leaking of urine, does not  have the sensation to urinate sometimes. History of present Illness: per GI visit:   Susy Sanchez is a 70-year-old female, known to the practice, who presents for followup after EGD and colonoscopy. Patient had EGD with Dr. Smith Lopez on 05/19/2022 for complaints of chronic fatigue with multiple vitamin deficiencies including Y49, folic acid, vitamin D as well as complaints of epigastric discomfort and nausea and vomiting. Patient also had colonoscopy due to change in bowel habits with diarrhea. Findings of the EGD and colonoscopy were reviewed with the patientplease see results below. Patient states at this time she has some lower abdominal cramping occasionally. She states it typically is associated with her menstrual cycle, but she will occasionally have cramps before defecation. She complains of nausea, but denies vomiting.   She states this has improved since she started taking omeprazole. She denies any heartburn or acid reflux. She denies dysphagia or odynophagia. She states she stopped drinking caffeine. She states she continues to have irregular bowel habits with both constipation and diarrhea. She denies melena or hematochezia. Past Medical History:  has a past medical history of Abdominal pain, Anemia, Belching, Change in bowel habits, Chills, Constipation, Depression, Diarrhea, Flatulence, Frequent headaches, Hyperlipidemia, Migraines, Tattoos, and Thyroid disease. Visit from 2022   HPI from Pt here to f/u on labs. Pt had been having some nausea  daily and had been referred to Gi for evaluation. It happens all day long, is better with eating but when belly gets empty the nausea returns. Denies dry heaving or vomiting does have some diarrhea associated with it off and on. Has not lost weight, has not tried any meds for it., has never Gi in the past and never had a colonoscopy. Denies black tarry stools or blood in stools. Has been feeling fatigued lately, sleeping a lot, denies snoring. Does see endocrinology being treated for hashimotos thyroiditis. Pt had an EGD and colon. ESOPHAGOGASTRODUODENOSCOPY RECORD     RE:  Aixa Polo DATE:  2022  CASE:  [id number]                                                                       :  1975  PHYSICIAN:  Valencia Primrose, M.D. PROCEDURE:  Esophagogastroduodenoscopy with biopsy. INSTRUMENTS:  Standard video upper scope. MEDICATIONS:  5 milligrams of IV Versed and 100 micrograms of Fentanyl. PHOTOGRAPHS:  Yes. BIOPSIES:  Yes. INDICATIONS:  The patient with a history of K92 deficiency, folic acid deficiency, chronic fatigue, vitamin D deficiency, epigastric discomfort, non-intractable nausea and vomiting, excess use of caffeine intake, change in bowel habits, and diarrhea.   We planned today for EGD to evaluate. ASA CLASSIFICATION:  II.     ESTIMATED BLOOD LOSS:  None. PROCEDURE:  The patient was brought to the GI Lab. Consent was obtained. Risks involved with the procedure were explained to the patient. Informed consent obtained. The patient was monitored during the procedure with pulse oximetry and blood pressure monitoring. Oxygen was given by nasal cannula. Sedation was done by incremental doses of IV Versed total 5 milligrams of Versed and 100 micrograms of Fentanyl given by incremental doses given by the anesthesia service to achieve monitored anesthesia care. For ASA Classification, medication given during the procedure, please see anesthesia notes. Standard video upper scope was advanced under direct vision from the oral cavity up to the duodenum. The esophagus showed features of grade II erosive esophagitis, biopsy obtained to evaluate. The gastroesophageal junction is at 40 cm from the incisors. The scope was advanced to the stomach. Retroflexed examination of the cardia revealed normal cardia. Antrum showed patchy erythema and mild gastritis, biopsy obtained. Duodenum showed mild duodenitis. I also elected to take a biopsy from the duodenum due to question of celiac disease as well as R49, folic acid, and iron deficiency anemia to evaluate for malabsorption. The scope was withdrawn with no immediate complications. IMPRESSION:     1. Mild duodenitis, biopsy obtained, also due to history of multivitamin deficiency and to rule out malabsorption. 2. Gastritis. 3. Grade II esophagitis. PLAN:     1. Patient is to be on H2 blocker or PPI. 2. Follow up on the biopsy results and return to the GI Clinic for evaluation. 3. More recommendations after colonoscopy with biopsy due to history of diarrhea and weight loss. COLONOSCOPY RECORD     RE:  Morenita Cardona                                                        DATE:  05/26/2022  CASE:  [id number] :  1975  PHYSICIAN:  Francesca Brantley M.D. PROCEDURE:  Colonoscopy with biopsy. INSTRUMENTS:  Standard video colonoscope. MEDICATIONS:  Propofol total IV. PHOTOGRAPHS:  Yes. BIOPSIES:  Yes. INDICATIONS:  The patient with a history of diarrhea and change in bowel habits. We planned today for colonoscopy to evaluate. ASA CLASSIFICATION:  II.     ESTIMATED BLOOD LOSS:  None. PROCEDURE:  The patient was brought to the GI Lab. Consent was obtained. Risks involved with the procedure were explained to the patient. Informed consent obtained. The patient was monitored during the procedure with pulse oximetry and blood pressure monitoring. Oxygen was given by nasal cannula. Sedation was done by incremental doses of IV propofol given by the anesthesia service to achieve monitored anesthesia care. For ASA Classification, medication given during the procedure, please see anesthesia notes. Digital examination revealed normal rectum. Standard video colonoscope was advanced under direct vision from the rectum up to the cecum. Prep was good and the patient tolerated the procedure well. Cecum intubation was confirmed by seeing the appendiceal orifice. The scope was withdrawn. No polyps, no masses, no diverticulitis, no colitis. Everything appeared to be normal.  Due to history of diarrhea, random biopsy obtained from the terminal ileum in one jar, ascending transverse in second jar, and descending sigmoid in third jar and the procedure was terminated with no immediate complications. IMPRESSION:     1. Normal colonoscopy up to the terminal ileum. 2. Random biopsy obtained from different locations due to history of diarrhea. PLAN:     1. Follow up on the biopsy results and return to the GI Clinic for evaluation. More recommendations after reviewing the biopsy results.      Results reviewed, pt states symptoms remain about the same, she has not started the prilosec yet. She does continue to take the folic acid and vitamin B 12 and vitamin d supplements. Has orders for repeat labs next month. Pt is taking the oxybutynin for bladder leakage and states it is working well for her.      Lab Results       Component                Value               Date                       WBC                      10.9                04/28/2022                 HGB                      14.4                04/28/2022                 HCT                      42.7                04/28/2022                 MCV                      85                  04/28/2022                 PLT                      327                 04/28/2022            Lab Results       Component                Value               Date                       NA                       138                 04/28/2022                 K                        4.0                 04/28/2022                 CL                       105                 04/28/2022                 CO2                      26                  04/28/2022                 BUN                      8                   04/28/2022                 CREATININE               1.06                04/28/2022                 GLUCOSE                  98                  04/28/2022                 CALCIUM                  9.0                 04/28/2022               Lab Results       Component                Value               Date                       IRON                     121                 01/09/2020                 TIBC                     474 (H)             04/28/2022                 FERRITIN                 11                  04/28/2022                     Current Outpatient Medications   Medication Sig Dispense Refill    oxybutynin (DITROPAN XL) 15 MG extended release tablet Take 1 tablet by mouth daily 90 tablet 1    Cholecalciferol (VITAMIN D3) 1.25 MG (90302 UT) CAPS TAKE 1 CAPSULE BY MOUTH 3 TIMES WEEKLY 12 capsule 1    PARoxetine (PAXIL) 20 MG tablet Take 1 tablet by mouth daily 90 tablet 3    omeprazole (PRILOSEC) 20 MG delayed release capsule Take 1 capsule by mouth Daily 90 capsule 1    Cyanocobalamin (B-12) 3000 MCG SUBL Place 1 tablet under the tongue daily 90 tablet 1    folic acid (FOLVITE) 1 MG tablet Take 1 tablet by mouth daily 90 tablet 1    topiramate (TOPAMAX) 50 MG tablet Take 1 tablet by mouth 2 times daily 180 tablet 3    ondansetron (ZOFRAN) 4 MG tablet Take 1 tablet by mouth 3 times daily as needed for Nausea or Vomiting 30 tablet 0    sucralfate (CARAFATE) 1 GM tablet Take 1 tablet by mouth 4 times daily 120 tablet 3    levothyroxine (SYNTHROID) 100 MCG tablet Take 1 tablet by mouth daily 90 tablet 1    SUMAtriptan (IMITREX) 25 MG tablet May take 1 tablet at the onset of a headache may repeat X1 dose,  2 hours later if needed. Not to exceed 2 tablets in 24 hours. 9 tablet 2    loratadine (CLARITIN) 10 MG tablet Take 1 tablet by mouth daily 30 tablet 3    Acetaminophen (TYLENOL EXTRA STRENGTH PO) Take 1,500 mg by mouth every 6 hours as needed        No current facility-administered medications for this visit.           Past Medical History:   Diagnosis Date    Abdominal pain     Anemia     Belching     Change in bowel habits     Chills     Constipation     Depression     Diarrhea     Flatulence     Frequent headaches     Hyperlipidemia     Migraines     Tattoos     Thyroid disease       Past Surgical History:   Procedure Laterality Date    OTHER SURGICAL HISTORY  11-10-14    diagnostic laparoscopy, lysis of adhesions, cautery of endometriosis    [place]  DELIVERY ONLY          TUBAL LIGATION       Family History   Problem Relation Age of Onset    Heart Disease Mother     High Blood Pressure Mother     Other Mother         Aneurysm    Other Maternal Grandmother         Passed away from Heart Attack    Other Maternal Grandfather         Alcoholism    Diabetes Paternal Grandmother     Other Paternal Grandmother         Kidney failure due to diabetes    Heart Disease Brother     Colon Polyps Neg Hx     Colon Cancer Neg Hx     Esophageal Cancer Neg Hx      Social History     Tobacco Use    Smoking status: Some Days     Packs/day: 1.50     Years: 30.00     Pack years: 45.00     Types: Cigarettes     Start date: 1/1/1988     Last attempt to quit: 12/20/2018     Years since quitting: 3.7    Smokeless tobacco: Never   Substance Use Topics    Alcohol use: Yes     Alcohol/week: 0.0 standard drinks     Comment: Socially        Allergies   Allergen Reactions    Soybean-Containing Drug Products        Health Maintenance   Topic Date Due    COVID-19 Vaccine (1) Never done    Pneumococcal 0-64 years Vaccine (1 - PCV) Never done    Cervical cancer screen  Never done    Flu vaccine (1) 09/01/2022    Depression Monitoring  04/27/2023    Lipids  01/11/2024    DTaP/Tdap/Td vaccine (3 - Td or Tdap) 09/04/2031    Colorectal Cancer Screen  05/26/2032    Hepatitis C screen  Completed    HIV screen  Addressed    Hepatitis A vaccine  Aged Out    Hepatitis B vaccine  Aged Out    Hib vaccine  Aged Out    Meningococcal (ACWY) vaccine  Aged Out       Subjective:      Review of Systems   Constitutional:  Positive for fatigue. Negative for chills, fever and unexpected weight change. Respiratory: Negative. Cardiovascular: Negative. Gastrointestinal:  Positive for abdominal pain (RUQ off and on), diarrhea (Gi addressing) and nausea. Negative for abdominal distention, rectal pain and vomiting. Genitourinary:  Positive for frequency and urgency. Negative for difficulty urinating, dysuria, flank pain and menstrual problem. Having regular periods, one every month    Skin: Negative. Neurological:  Negative for dizziness. Psychiatric/Behavioral:  Negative for self-injury, sleep disturbance and suicidal ideas. The patient is not nervous/anxious.       Objective:      /72   Pulse 65 likely causing any fatigue. F/u with GI for nausea, R/O cholecystitis with U/S and Hida scan, may also need to consider gastric emptying study to R/O gastroparesis causing nausea. May also still have nausea due to gastritis   Orders Placed This Encounter   Procedures    US GALLBLADDER RUQ    NM HEPATOBILIARY SCAN W PHARMACOLOGICAL INTERVENTION    Vitamin D 25 Hydroxy    [facility]      Pt in agreement with plan. May be moving to [place]. Return in about 6 months (around 3/1/2023) for check up . Reccommended tobaccocessation options including pharmacologic methods, counseled great than 3 minutesduring this visit:  Yes[]  No  []       Patient given educational materials -see patient instructions. Discussed use, benefit, and side effects of prescribedmedications. All patient questions answered. Pt voiced understanding. Reviewedhealth maintenance. Instructed to continue current medications, diet and exercise. Patient agreed with treatment plan. Follow up as directed.        Electronicallysigned by ANGELA Lopez CNP on 9/1/2022 at 8:37 AM

## 2022-09-01 NOTE — TELEPHONE ENCOUNTER
US gallbladder   Monday 10/3/22  Arrive at 2:00 PM     * Nothing to eat or drink 8 hours prior  * Fat free supper the night before       Pt informed and understanding with no further questions

## 2022-09-13 ENCOUNTER — TELEPHONE (OUTPATIENT)
Dept: CARDIOLOGY CLINIC | Age: 47
End: 2022-09-13

## 2022-09-13 NOTE — TELEPHONE ENCOUNTER
NP referral from [facility]   R11.0 (ICD-10-CM) - Chronic nausea   Z72.0 (ICD-10-CM) - Tobacco use   Z82.49 (ICD-10-CM) - Family history of heart disease   R07.9 (ICD-10-CM) - Intermittent chest pain   LM for pt to return call.

## 2022-09-15 ENCOUNTER — OFFICE VISIT (OUTPATIENT)
Dept: CARDIOLOGY CLINIC | Age: 47
End: 2022-09-15
Payer: COMMERCIAL

## 2022-09-15 VITALS
HEIGHT: 65 IN | WEIGHT: 155.13 LBS | DIASTOLIC BLOOD PRESSURE: 89 MMHG | SYSTOLIC BLOOD PRESSURE: 126 MMHG | HEART RATE: 72 BPM | BODY MASS INDEX: 25.85 KG/M2

## 2022-09-15 DIAGNOSIS — R06.02 SOB (SHORTNESS OF BREATH): Primary | ICD-10-CM

## 2022-09-15 DIAGNOSIS — R12 HEART BURN: ICD-10-CM

## 2022-09-15 DIAGNOSIS — R07.9 CHEST PAIN IN ADULT: ICD-10-CM

## 2022-09-15 DIAGNOSIS — R94.31 ABNORMAL EKG: ICD-10-CM

## 2022-09-15 DIAGNOSIS — R06.09 DOE (DYSPNEA ON EXERTION): ICD-10-CM

## 2022-09-15 PROCEDURE — 99204 OFFICE O/P NEW MOD 45 MIN: CPT | Performed by: INTERNAL MEDICINE

## 2022-09-15 PROCEDURE — 93000 ELECTROCARDIOGRAM COMPLETE: CPT | Performed by: INTERNAL MEDICINE

## 2022-09-15 RX ORDER — ASPIRIN 81 MG/1
81 TABLET ORAL DAILY
Qty: 90 TABLET | Refills: 1 | Status: SHIPPED | OUTPATIENT
Start: 2022-09-15

## 2022-09-15 NOTE — PROGRESS NOTES
New patient here for check up -ref by GI    Pt c/o sob, chest pain, describes as heart burn radiates to right arm, heart palpitations, describes as heart flutter    EKG done today

## 2022-09-15 NOTE — PROGRESS NOTES
[facility] [address]  [facility] [place] [address]  Dept: [phone]  Dept Fax: [phone]  Loc: [phone]    Visit Date: 9/15/2022    Ms. Lilo Hogan is a 49 y.o. female  who presented for:  FH of heart disease  Chest pain   New referral   HPI:   HPI   Luz Bone is a pleasant 49year old female patient who  has a past medical history of Abdominal pain, Anemia, Belching, Change in bowel habits, Chills, Constipation, Depression, Diarrhea, Flatulence, Frequent headaches, Hyperlipidemia, Migraines, Tattoos, and Thyroid disease. She has h/o tobacco abuse, 0.5-1 PPD. Her family history is significant for CAD. Her mother had two CABG surgeries, first in her 35s. Her grandmother  from [place]. She has been evaluated by GI for GERD. EKG today revealed SR, anterior infarct. The patient was referred to cardiology for evaluation of chest pains. The patient reports intermittent episodes of retrosternal chest pain, burning like, not radiating, up to 4/10 in severity, occurs more at rest. She states the pains increased in the past one month. She has fatigue, shortness of breath, dyspnea on exertion. She gets tired easily. She reports difficulty climbing stairs, SOB by the second flight.        Current Outpatient Medications:     ondansetron (ZOFRAN) 4 MG tablet, Take 1 tablet by mouth 3 times daily as needed for Nausea or Vomiting, Disp: 30 tablet, Rfl: 0    omeprazole (PRILOSEC) 20 MG delayed release capsule, Take 1 capsule by mouth Daily, Disp: 90 capsule, Rfl: 1    oxybutynin (DITROPAN XL) 15 MG extended release tablet, Take 1 tablet by mouth daily, Disp: 90 tablet, Rfl: 1    Cholecalciferol (VITAMIN D3) 1.25 MG (12721 UT) CAPS, TAKE 1 CAPSULE BY MOUTH 3 TIMES WEEKLY, Disp: 12 capsule, Rfl: 1    PARoxetine (PAXIL) 20 MG tablet, Take 1 tablet by mouth daily, Disp: 90 tablet, Rfl: 3    Cyanocobalamin (B-12) 3000 MCG SUBL, Place 1 tablet under the congestion, sinus pressure, sneezing and sore throat. Eyes: Negative for pain, discharge, redness and itching. Respiratory: Negative for apnea, cough  Gastrointestinal: Negative for blood in stool, constipation, diarrhea   Endocrine: Negative for cold intolerance, heat intolerance, polydipsia. Genitourinary: Negative for dysuria, enuresis, flank pain and hematuria. Musculoskeletal: Negative for arthralgias, joint swelling and neck pain. Neurological: Negative for numbness and headaches. Psychiatric/Behavioral: Negative for agitation, confusion, decreased concentration and dysphoric mood. Objective:     LMP 08/26/2022 (Approximate)     Wt Readings from Last 3 Encounters:   09/13/22 157 lb (71.2 kg)   09/01/22 157 lb 6.4 oz (71.4 kg)   06/14/22 155 lb 9.6 oz (70.6 kg)     BP Readings from Last 3 Encounters:   09/13/22 126/84   09/01/22 124/72   06/14/22 124/78       Nursing note and vitals reviewed. Physical Exam   Constitutional: Oriented to person, place, and time. Appears well-developed and well-nourished. ENT: Moist mucous membranes. No bleeding. Tongue is midline. Head: Normocephalic and atraumatic. Eyes: EOM are normal. Pupils are equal, round, and reactive to light. Neck: Normal range of motion. Neck supple. No JVD present. Cardiovascular: Normal rate, regular rhythm, II/VI murmur, no rubs, and intact distal pulses. Pulmonary/Chest: Effort normal and breath sounds normal. No respiratory distress. No wheezes. No rales. Abdominal: Soft. Bowel sounds are normal. No distension. There is no tenderness. Musculoskeletal: Normal range of motion. no edema. Neurological: Alert and oriented to person, place, and time. No cranial nerve deficit. Coordination normal.   Skin: Skin is warm and dry. Psychiatric: Normal mood and affect.        No results found for: CKTOTAL, CKMB, CKMBINDEX    Lab Results   Component Value Date/Time    WBC 5.0 08/30/2022 09:41 AM    RBC 4.91 08/30/2022 09:41 AM    RBC 5.02 04/28/2022 10:20 AM    HGB 13.6 08/30/2022 09:41 AM    HCT 42.5 08/30/2022 09:41 AM    MCV 86.6 08/30/2022 09:41 AM    MCH 27.7 08/30/2022 09:41 AM    MCHC 32.0 08/30/2022 09:41 AM    RDW 15.7 04/28/2022 10:20 AM     08/30/2022 09:41 AM    MPV 10.4 08/30/2022 09:41 AM       Lab Results   Component Value Date/Time     04/28/2022 10:20 AM    K 4.0 04/28/2022 10:20 AM     04/28/2022 10:20 AM    CO2 26 04/28/2022 10:20 AM    BUN 8 04/28/2022 10:20 AM    LABALBU 4.2 04/28/2022 10:20 AM    CREATININE 1.06 04/28/2022 10:20 AM    CALCIUM 9.0 04/28/2022 10:20 AM    LABGLOM 77 11/22/2020 12:36 PM    GLUCOSE 98 04/28/2022 10:20 AM       Lab Results   Component Value Date/Time    ALKPHOS 54 04/28/2022 10:20 AM    ALKPHOS 60 11/22/2020 12:36 PM    ALT 8 04/28/2022 10:20 AM    AST 15 04/28/2022 10:20 AM    PROT 6.9 04/28/2022 10:20 AM    BILITOT 0.5 04/28/2022 10:20 AM    BILIDIR <0.2 02/04/2016 07:20 AM    LABALBU 4.2 04/28/2022 10:20 AM       No results found for: MG    No results found for: INR, PROTIME      No results found for: LABA1C    Lab Results   Component Value Date/Time    TRIG 131 01/11/2019 10:51 AM    HDL 54 01/11/2019 10:51 AM    LDLCALC 125 01/11/2019 10:51 AM       Lab Results   Component Value Date/Time    TSH 0.946 06/15/2020 03:05 PM         Testing Reviewed:      I have individually reviewed the cardiac test below:    ECHO: No results found for this or any previous visit. Stress Test:2019   Summary   Had Mild chest pain 2/10 during the 3rd min in the recovery and resolved   after total of 2 min   Exercise EKG stress test is not suggestive for ischemia. Recommendation   Clinical correlation is recommended.       Signatures    AssessmentPlan:     Ricardo Maria is a pleasant 49year old female patient who  has a past medical history of Abdominal pain, Anemia, Belching, Change in bowel habits, Chills, Constipation, Depression, Diarrhea, Flatulence, Frequent headaches, Hyperlipidemia, Migraines, Tattoos, and Thyroid disease. She has h/o tobacco abuse, 0.5-1 PPD. Her family history is significant for CAD. Her mother had two CABG surgeries, first in her 35s. Her grandmother  from [place]. She has been evaluated by GI for GERD. EKG today revealed SR, anterior infarct. The patient was referred to cardiology for evaluation of chest pains. The patient reports intermittent episodes of retrosternal chest pain, burning like, not radiating, up to 4/10 in severity, occurs more at rest. She states the pains increased in the past one month. She has fatigue, shortness of breath, dyspnea on exertion. She gets tired easily. She reports difficulty climbing stairs, SOB by the second flight. Chest pain   H/o tobacco abuse   WORSENING DYSPNEA ON EXERTION   Abnormal EKG  Anterior infarct   FH of premature CAD   GERD    GI work up was negative  Concern for cardiac etiology  Smoking: discussed with the patient the importance of smoke cessation especially with the risk of CAD. FH of CAD  Based on patient's risk factors and clinical presentation, stress test is indicated to investigate for possible underlying ischemic heart disease. Patient  agrees with that work up and its risks and benefits and potential need for additional testing if stress test is abnormal such as cardiac catheterization  Will need to investigate for underlying structural heart disease, will proceed with a transthoracic echocardiogram   ASA 81 mg po daily   Lipid panel  Patient was advised to report to the ER if has recurrent symptoms with specific instructions given about severity and duration of symptoms    Above findings and plan of care were discussed with patient in details, patient's questions were answered.      Disposition:  RTC in 12 months             Electronically signed by Zaida Smith MD, [address] [facility]    9/15/2022 at 11:50 AM EDT

## 2022-10-03 ENCOUNTER — HOSPITAL ENCOUNTER (OUTPATIENT)
Dept: ULTRASOUND IMAGING | Age: 47
Discharge: HOME | End: 2022-10-03
Payer: COMMERCIAL

## 2022-10-03 ENCOUNTER — HOSPITAL ENCOUNTER (OUTPATIENT)
Dept: NON INVASIVE DIAGNOSTICS | Age: 47
Discharge: HOME | End: 2022-10-03
Payer: COMMERCIAL

## 2022-10-03 DIAGNOSIS — R07.9 CHEST PAIN IN ADULT: ICD-10-CM

## 2022-10-03 DIAGNOSIS — R11.0 NAUSEA: ICD-10-CM

## 2022-10-03 DIAGNOSIS — R94.31 ABNORMAL EKG: ICD-10-CM

## 2022-10-03 DIAGNOSIS — R06.02 SOB (SHORTNESS OF BREATH): ICD-10-CM

## 2022-10-03 DIAGNOSIS — R12 HEART BURN: ICD-10-CM

## 2022-10-03 DIAGNOSIS — G89.29 CHRONIC RUQ PAIN: ICD-10-CM

## 2022-10-03 DIAGNOSIS — R06.09 DOE (DYSPNEA ON EXERTION): ICD-10-CM

## 2022-10-03 DIAGNOSIS — R10.11 CHRONIC RUQ PAIN: ICD-10-CM

## 2022-10-03 LAB
LV EF: 63 %
LVEF MODALITY: NORMAL

## 2022-10-03 PROCEDURE — 3430000000 HC RX DIAGNOSTIC RADIOPHARMACEUTICAL: Performed by: INTERNAL MEDICINE

## 2022-10-03 PROCEDURE — 93306 TTE W/DOPPLER COMPLETE: CPT

## 2022-10-03 PROCEDURE — 6360000002 HC RX W HCPCS

## 2022-10-03 PROCEDURE — A9500 TC99M SESTAMIBI: HCPCS | Performed by: INTERNAL MEDICINE

## 2022-10-03 PROCEDURE — 78452 HT MUSCLE IMAGE SPECT MULT: CPT

## 2022-10-03 PROCEDURE — 93017 CV STRESS TEST TRACING ONLY: CPT | Performed by: INTERNAL MEDICINE

## 2022-10-03 PROCEDURE — 76705 ECHO EXAM OF ABDOMEN: CPT

## 2022-10-03 RX ADMIN — Medication 10.6 MILLICURIE: at 11:40

## 2022-10-03 RX ADMIN — Medication 35 MILLICURIE: at 12:30

## 2022-10-04 ENCOUNTER — TELEPHONE (OUTPATIENT)
Dept: FAMILY MEDICINE CLINIC | Age: 47
End: 2022-10-04

## 2022-10-04 NOTE — TELEPHONE ENCOUNTER
Patient returned a call back but there was nothing to give patient   Patient did verify that she was told that the HIDA scan was not done. I spoke with Valentina Marrufo regarding the scheduling of the NM hepatobiliary scan when she scheduled the there tests an it may have been missed.     I have faxed over the NM hepatobiliary scan to [phone] to specials and they will reach out to the patient to get this testing set up

## 2022-10-04 NOTE — TELEPHONE ENCOUNTER
----- Message from Rose Mills, DO sent at 10/4/2022  6:52 AM EDT -----  Please let pt know that RUQ US w/o acute pathology  I see where Kei also ordered a HIDA scan, where are we on scheduling that? Let me know, thanks! Let me know if questions, thanks!

## 2022-10-04 NOTE — TELEPHONE ENCOUNTER
Pt informed and verbalized understanding.    Pt was transferred to [facility] scheduling  to schedule the test

## 2022-10-04 NOTE — TELEPHONE ENCOUNTER
Good deal, needs done, nimesh with RUQ US being neg  We will call her with those results once available  Let me know if questions, thanks!

## 2022-11-14 DIAGNOSIS — E53.8 FOLATE DEFICIENCY: ICD-10-CM

## 2022-11-14 RX ORDER — FOLIC ACID 1 MG/1
TABLET ORAL
Qty: 90 TABLET | Refills: 3 | Status: SHIPPED | OUTPATIENT
Start: 2022-11-14

## 2023-05-13 DIAGNOSIS — G43.809 OTHER MIGRAINE WITHOUT STATUS MIGRAINOSUS, NOT INTRACTABLE: ICD-10-CM

## 2023-05-15 RX ORDER — TOPIRAMATE 50 MG/1
TABLET, FILM COATED ORAL
Qty: 180 TABLET | Refills: 3 | Status: SHIPPED | OUTPATIENT
Start: 2023-05-15

## 2023-05-15 NOTE — TELEPHONE ENCOUNTER
Recent Visits  Date Type Provider Dept   09/01/22 Office Visit Mitesh Bray, ANGELA - CNP [facility]   06/01/22 Office Visit ANGELA Blair - CNP [facility]   04/27/22 Office Visit Sagastume Katarina, APRN - CNP [facility]   Showing recent visits within past 540 days with a meds authorizing provider and meeting all other requirements  Future Appointments  No visits were found meeting these conditions.   Showing future appointments within next 150 days with a meds authorizing provider and meeting all other requirements      Future Appointments   Date Time Provider Nimisha Rausch   9/19/2023  9:15 AM Adams Liz [email or web address]

## 2023-09-14 DIAGNOSIS — N32.81 OVERACTIVE BLADDER: ICD-10-CM

## 2023-09-14 RX ORDER — OXYBUTYNIN CHLORIDE 15 MG/1
15 TABLET, EXTENDED RELEASE ORAL DAILY
Qty: 90 TABLET | Refills: 1 | Status: SHIPPED | OUTPATIENT
Start: 2023-09-14

## 2023-11-09 DIAGNOSIS — E53.8 FOLATE DEFICIENCY: ICD-10-CM

## 2023-11-09 RX ORDER — FOLIC ACID 1 MG/1
1000 TABLET ORAL DAILY
Qty: 90 TABLET | Refills: 3 | Status: SHIPPED | OUTPATIENT
Start: 2023-11-09

## 2024-07-11 RX ORDER — ASPIRIN 81 MG/1
81 TABLET, COATED ORAL DAILY
Qty: 90 TABLET | Refills: 1 | OUTPATIENT
Start: 2024-07-11